# Patient Record
Sex: FEMALE | Race: WHITE | NOT HISPANIC OR LATINO | Employment: FULL TIME | URBAN - METROPOLITAN AREA
[De-identification: names, ages, dates, MRNs, and addresses within clinical notes are randomized per-mention and may not be internally consistent; named-entity substitution may affect disease eponyms.]

---

## 2017-01-04 ENCOUNTER — HOSPITAL PATHOLOGY (OUTPATIENT)
Dept: OTHER | Facility: CLINIC | Age: 57
End: 2017-01-04

## 2017-01-05 LAB — COPATH REPORT: NORMAL

## 2017-03-06 ENCOUNTER — RADIANT APPOINTMENT (OUTPATIENT)
Dept: GENERAL RADIOLOGY | Facility: CLINIC | Age: 57
End: 2017-03-06
Attending: FAMILY MEDICINE
Payer: COMMERCIAL

## 2017-03-06 ENCOUNTER — OFFICE VISIT (OUTPATIENT)
Dept: URGENT CARE | Facility: URGENT CARE | Age: 57
End: 2017-03-06
Payer: COMMERCIAL

## 2017-03-06 VITALS
DIASTOLIC BLOOD PRESSURE: 86 MMHG | TEMPERATURE: 98 F | HEART RATE: 79 BPM | WEIGHT: 170 LBS | SYSTOLIC BLOOD PRESSURE: 118 MMHG | OXYGEN SATURATION: 97 % | BODY MASS INDEX: 33.15 KG/M2

## 2017-03-06 DIAGNOSIS — M72.2 PLANTAR FASCIITIS OF LEFT FOOT: Primary | ICD-10-CM

## 2017-03-06 DIAGNOSIS — M72.2 PLANTAR FASCIITIS OF LEFT FOOT: ICD-10-CM

## 2017-03-06 PROCEDURE — 73630 X-RAY EXAM OF FOOT: CPT | Mod: LT

## 2017-03-06 PROCEDURE — 99213 OFFICE O/P EST LOW 20 MIN: CPT | Performed by: FAMILY MEDICINE

## 2017-03-06 NOTE — NURSING NOTE
"Chief Complaint   Patient presents with     Urgent Care     Musculoskeletal Problem     Has had pain in her left heel, saw podiatry and had an injection, but nothing is helping. It started hurting 8 months ago. Hurts a lot to walk. Would like an xray.        Initial /86  Pulse 79  Temp 98  F (36.7  C) (Tympanic)  Wt 170 lb (77.1 kg)  LMP 07/03/2007  SpO2 97%  BMI 33.15 kg/m2 Estimated body mass index is 33.15 kg/(m^2) as calculated from the following:    Height as of 12/31/16: 5' 0.05\" (1.525 m).    Weight as of this encounter: 170 lb (77.1 kg).  Medication Reconciliation: angelina Connors   "

## 2017-03-06 NOTE — LETTER
[facility] URGENT [facility]  [address]  [address]  [place] [address] [phone]      March 6, 2017    RE:  Saadia Sorensen                                                                                                                                                       [address] [address] [address]            To whom it may concern:    Saadia Sorensen is under my professional care for Plantar fasciitis of left foot.   She  may return to work with the following: Light duty-unable to stand/ walk more than 2 hours per shift for 2 weeks,  May use a cane as needed.          Sincerely,        Jeri Hamm    [facility]

## 2017-03-06 NOTE — PATIENT INSTRUCTIONS
Treating Plantar Fasciitis  First, your healthcare provider tries to determine the cause of your problem in order to suggest ways to relieve pain. If your pain is due to poor foot mechanics, custom-made shoe inserts (orthoses) may help.    Reduce symptoms    To relieve mild symptoms, try aspirin, ibuprofen, or other medicines as directed. Rubbing ice on the affected area may also help.    To reduce severe pain and swelling, your healthcare provider may prescribe pills or injections or a walking cast in some instances. Physical therapy, such as ultrasound or a daily stretching program, may also be recommended. Surgery is rarely required.    To reduce symptoms caused by poor foot mechanics, your foot may be taped. This supports the arch and temporarily controls movement. Night splints may also help by stretching the fascia.  Control movement  If taping helps, your healthcare provider may prescribe orthoses. Built from plaster casts of your feet, these inserts control the way your foot moves. As a result, your symptoms should go away.  Reduce overuse  Every time your foot strikes the ground, the plantar fascia is stretched. You can reduce the strain on the plantar fascia and the possibility of overuse by following these suggestions:    Lose any excess weight.    Avoid running on hard or uneven ground.    Use orthoses at all times in your shoes and house slippers.  If surgery is needed  Your healthcare provider may consider surgery if other types of treatment don't control your pain. During surgery, the plantar fascia is partially cut to release tension. As you heal, fibrous tissue fills the space between the heel bone and the plantar fascia.     5632-8972 [employer]. [address] [place] [address], [place] [address]. All rights reserved. This information is not intended as a substitute for professional medical care. Always follow your healthcare professional's instructions.        What Is Plantar Fasciitis?    The plantar fascia is a ligament-like band running from your heel to the ball of your foot. This band pulls on the heel bone, raising the arch of your foot as it pushes off the ground. But if your foot moves incorrectly, the plantar fascia may become strained. The fascia may swell and its tiny fibers may begin to fray, causing plantar fasciitis.  Causes  Plantar fasciitis is often caused by poor foot mechanics. If your foot flattens too much, the fascia may overstretch and swell. If your foot flattens too little, the fascia may ache from being pulled too tight.     Foot flattens too much        Foot flattens too little   Symptoms  With plantar fasciitis, the bottom of your foot may hurt when you stand, especially first thing in the morning. Pain usually happens on the inside of the foot, near the spot where your heel and arch meet. Pain may lessen after a few steps, but it comes back after rest or with prolonged movement.  Related problems  A heel spur is extra bone that may grow near the spot where the plantar fascia attaches to the heel. The heel spur may form in response to the plantar fascia s tug on the heel bone.  Bursitis is the swelling of a bursa, a fluid-filled sac that reduces friction between a ligament and a bone. Bursitis may develop if a swollen plantar fascia presses against a plantar bursa.    0182-4288 [employer]. [address] [place] [address], [place] [address]. All rights reserved. This information is not intended as a substitute for professional medical care. Always follow your healthcare professional's instructions.

## 2017-03-06 NOTE — PROGRESS NOTES
SUBJECTIVE:   Chief Complaint   Patient presents with     Urgent Care     Musculoskeletal Problem     Has had pain in her left heel, saw podiatry and had an injection, but nothing is helping. It started hurting 8 months ago. Hurts a lot to walk. Would like an xray.      Saadia Sorensen is a 56 year old female complains of localized left heel pain for 5 months. This hurts first thing in the morning immediately upon weight bearing, less so throughout the day. The patient denies a history of injury.  Had steroid injection with podiatry,  Has been seeing chiropracter without relief    Past Medical History   Diagnosis Date     Calculus of kidney      x 2     Gastritis 12/17/2013       ALLERGIES:  Codeine      Current Outpatient Prescriptions on File Prior to Visit:  order for DME Equipment being ordered: cast boot   omeprazole (PRILOSEC) 20 MG capsule Take 1 capsule (20 mg) by mouth daily (Patient not taking: Reported on 3/6/2017)   simvastatin (ZOCOR) 10 MG tablet Take 1 tablet (10 mg) by mouth At Bedtime (Patient not taking: Reported on 3/6/2017)   loratadine-pseudoePHEDrine (CLARITIN-D 24-HOUR)  MG per tablet Take 1 tablet by mouth daily (Patient not taking: Reported on 3/6/2017)     No current facility-administered medications on file prior to visit.     Social History   Substance Use Topics     Smoking status: Never Smoker     Smokeless tobacco: Never Used     Alcohol use No       Family History   Problem Relation Age of Onset     CANCER Mother      colon cancer, colostomy bag     Lipids Mother      Family History Negative Father      Family History Negative Maternal Grandmother      Family History Negative Maternal Grandfather      Family History Negative Paternal Grandmother      Family History Negative Paternal Grandfather      Family History Negative Brother      Family History Negative Sister          ROS:  INTEGUMENTARY/SKIN: NEGATIVE for worrisome rashes, moles or lesions  EYES: NEGATIVE for vision changes  or irritation  ENT/MOUTH: NEGATIVE for ear, mouth and throat problems  RESP:NEGATIVE for significant cough or SOB    OBJECTIVE:  /86  Pulse 79  Temp 98  F (36.7  C) (Tympanic)  Wt 170 lb (77.1 kg)  LMP 07/03/2007  SpO2 97%  BMI 33.15 kg/m2  Patient appears well, vital signs are normal. Foot exam reveals   point tenderness over the inferior aspect of left heel, without masses, deformity or edema.  The rest of the foot and ankle exam is normal. Color and temperature of the feet is normal. Peripheral pulses are normal.    ASSESSMENT:  Plantar fasciitis of left foot     - XR Foot Left G/E 3 Views; Future       Acetaminophen and/or ibuprofen for pain   try to reduce walking on hard surfaces and use cushioned footwear    Patient was given educational materials   Advise rehabilitative stretching exercises to improve symptoms.   Discussed adapting insoles to reduce the pressure of the site of greatest pain  Note for work .

## 2017-03-06 NOTE — MR AVS SNAPSHOT
After Visit Summary   3/6/2017    Saadia Sorensen    MRN: [id number]           Patient Information     Date Of Birth          1960        Visit Information        Provider Department      3/6/2017 12:00 PM Jeri Hamm MD [facility]        Today's Diagnoses     Plantar fasciitis of left foot    -  1      Care Instructions      Treating Plantar Fasciitis  First, your healthcare provider tries to determine the cause of your problem in order to suggest ways to relieve pain. If your pain is due to poor foot mechanics, custom-made shoe inserts (orthoses) may help.    Reduce symptoms    To relieve mild symptoms, try aspirin, ibuprofen, or other medicines as directed. Rubbing ice on the affected area may also help.    To reduce severe pain and swelling, your healthcare provider may prescribe pills or injections or a walking cast in some instances. Physical therapy, such as ultrasound or a daily stretching program, may also be recommended. Surgery is rarely required.    To reduce symptoms caused by poor foot mechanics, your foot may be taped. This supports the arch and temporarily controls movement. Night splints may also help by stretching the fascia.  Control movement  If taping helps, your healthcare provider may prescribe orthoses. Built from plaster casts of your feet, these inserts control the way your foot moves. As a result, your symptoms should go away.  Reduce overuse  Every time your foot strikes the ground, the plantar fascia is stretched. You can reduce the strain on the plantar fascia and the possibility of overuse by following these suggestions:    Lose any excess weight.    Avoid running on hard or uneven ground.    Use orthoses at all times in your shoes and house slippers.  If surgery is needed  Your healthcare provider may consider surgery if other types of treatment don't control your pain. During surgery, the plantar fascia is partially cut to release tension. As you  heal, fibrous tissue fills the space between the heel bone and the plantar fascia.     1741-3736 [employer]. [address] [place] [address]. All rights reserved. This information is not intended as a substitute for professional medical care. Always follow your healthcare professional's instructions.        What Is Plantar Fasciitis?   The plantar fascia is a ligament-like band running from your heel to the ball of your foot. This band pulls on the heel bone, raising the arch of your foot as it pushes off the ground. But if your foot moves incorrectly, the plantar fascia may become strained. The fascia may swell and its tiny fibers may begin to fray, causing plantar fasciitis.  Causes  Plantar fasciitis is often caused by poor foot mechanics. If your foot flattens too much, the fascia may overstretch and swell. If your foot flattens too little, the fascia may ache from being pulled too tight.     Foot flattens too much        Foot flattens too little   Symptoms  With plantar fasciitis, the bottom of your foot may hurt when you stand, especially first thing in the morning. Pain usually happens on the inside of the foot, near the spot where your heel and arch meet. Pain may lessen after a few steps, but it comes back after rest or with prolonged movement.  Related problems  A heel spur is extra bone that may grow near the spot where the plantar fascia attaches to the heel. The heel spur may form in response to the plantar fascia s tug on the heel bone.  Bursitis is the swelling of a bursa, a fluid-filled sac that reduces friction between a ligament and a bone. Bursitis may develop if a swollen plantar fascia presses against a plantar bursa.    4749-3071 [employer]. [address] [place] [address]. All rights reserved. This information is not intended as a substitute for professional medical care. Always follow your healthcare professional's instructions.               "Follow-ups after your visit        Who to contact     If you have questions or need follow up information about today's clinic visit or your schedule please contact [facility] directly at [phone].  Normal or non-critical lab and imaging results will be communicated to you by MyChart, letter or phone within 4 business days after the clinic has received the results. If you do not hear from us within 7 days, please contact the clinic through MyChart or phone. If you have a critical or abnormal lab result, we will notify you by phone as soon as possible.  Submit refill requests through [employer] or call your pharmacy and they will forward the refill request to us. Please allow 3 business days for your refill to be completed.          Additional Information About Your Visit        [employer] Information     [employer] lets you send messages to your doctor, view your test results, renew your prescriptions, schedule appointments and more. To sign up, go to [email or web address] . Click on \"Log in\" on the left side of the screen, which will take you to the Welcome page. Then click on \"Sign up Now\" on the right side of the page.     You will be asked to enter the access code listed below, as well as some personal information. Please follow the directions to create your username and password.     Your access code is: [email or web address]  Expires: 2017  1:21 PM     Your access code will  in 90 days. If you need help or a new code, please call your [place] clinic or [phone].        Care EveryWhere ID     This is your Care EveryWhere ID. This could be used by other organizations to access your [place] medical records  [id number]        Your Vitals Were     Pulse Temperature Last Period Pulse Oximetry BMI (Body Mass Index)       79 98  F (36.7  C) (Tympanic) 2007 97% 33.15 kg/m2        Blood Pressure from Last 3 Encounters:   17 118/86   16 118/80   16 124/74    Weight from Last " 3 Encounters:   03/06/17 170 lb (77.1 kg)   12/31/16 168 lb 6.4 oz (76.4 kg)   11/29/16 168 lb 8 oz (76.4 kg)               Primary Care Provider Office Phone # Fax #    Susan Rachele Haase REY [phone]       [address] [address]        Thank you!     Thank you for choosing [facility]  for your care. Our goal is always to provide you with excellent care. Hearing back from our patients is one way we can continue to improve our services. Please take a few minutes to complete the written survey that you may receive in the mail after your visit with us. Thank you!             Your Updated Medication List - Protect others around you: Learn how to safely use, store and throw away your medicines at www.disposemymeds.org.          This list is accurate as of: 3/6/17  1:21 PM.  Always use your most recent med list.                   Brand Name Dispense Instructions for use    loratadine-pseudoePHEDrine  MG per 24 hr tablet    CLARITIN-D 24-hour    14 tablet    Take 1 tablet by mouth daily       omeprazole 20 MG CR capsule    priLOSEC    90 capsule    Take 1 capsule (20 mg) by mouth daily       order for DME     1 Device    Equipment being ordered: cast boot       simvastatin 10 MG tablet    ZOCOR    90 tablet    Take 1 tablet (10 mg) by mouth At Bedtime

## 2017-05-09 ENCOUNTER — OFFICE VISIT (OUTPATIENT)
Dept: FAMILY MEDICINE | Facility: CLINIC | Age: 57
End: 2017-05-09
Payer: COMMERCIAL

## 2017-05-09 VITALS
BODY MASS INDEX: 33.75 KG/M2 | RESPIRATION RATE: 14 BRPM | DIASTOLIC BLOOD PRESSURE: 85 MMHG | WEIGHT: 171.9 LBS | HEART RATE: 82 BPM | HEIGHT: 60 IN | OXYGEN SATURATION: 94 % | TEMPERATURE: 97.9 F | SYSTOLIC BLOOD PRESSURE: 123 MMHG

## 2017-05-09 DIAGNOSIS — H66.001 ACUTE SUPPURATIVE OTITIS MEDIA OF RIGHT EAR WITHOUT SPONTANEOUS RUPTURE OF TYMPANIC MEMBRANE, RECURRENCE NOT SPECIFIED: Primary | ICD-10-CM

## 2017-05-09 PROCEDURE — 99213 OFFICE O/P EST LOW 20 MIN: CPT | Performed by: FAMILY MEDICINE

## 2017-05-09 RX ORDER — AZITHROMYCIN 250 MG/1
TABLET, FILM COATED ORAL
Qty: 6 TABLET | Refills: 0 | Status: SHIPPED | OUTPATIENT
Start: 2017-05-09 | End: 2018-11-14

## 2017-05-09 NOTE — Clinical Note
Please abstract the following data from this visit with this patient into the appropriate field in Epic:  Pap smear done on this date: 12/30/16 (approximately), by this group: [facility], results were negative.

## 2017-05-09 NOTE — PROGRESS NOTES
SUBJECTIVE:                                                    Saadia Sorensen is a 56 year old female who presents to clinic today for the following health issues:      Concern - Ear problem     Onset: 2 days    Description:   Pain in both ears, but mostly the right side    Intensity: 6/10    Progression of Symptoms:  worsening    Accompanying Signs & Symptoms:  Sore throat and congestion       Previous history of similar problem:   yes    Precipitating factors:   Worsened by: nothing    Alleviating factors:  Improved by: nothing        Therapies Tried and outcome: Advil did not help much    Patient complains of congestion with sore throat, nasal congestion and sinus pain. Some mucopurulant discharge but no upper dental pain and no sustained fever. Duration less than one week.  Has past history of airborn allergy or asthma.   No chest pain, diaphoresis, or sob.  Mostly nonproductive cough.  TMs dull not*red, sinus tenderness right   lungs clear, s1s2,soft abd,no distress, cyanosis or stridor.  A:URI with asom and sinusitis  P:fluids, antipyretics,rest and meds as directed.  Recheck PRN worsening or non-improvement over 5 days.  Discussed signs of systemic or constutional illness.

## 2017-05-09 NOTE — NURSING NOTE
"Chief Complaint   Patient presents with     Ear Problem       Initial /85 (BP Location: Left arm, Patient Position: Chair, Cuff Size: Adult Large)  Pulse 82  Temp 97.9  F (36.6  C) (Oral)  Resp 14  Ht 5' 0.05\" (1.525 m)  Wt 171 lb 14.4 oz (78 kg)  LMP 07/03/2007  SpO2 94%  BMI 33.52 kg/m2 Estimated body mass index is 33.52 kg/(m^2) as calculated from the following:    Height as of this encounter: 5' 0.05\" (1.525 m).    Weight as of this encounter: 171 lb 14.4 oz (78 kg).  Medication Reconciliation: complete   Cristhian Mchugh CMA       "

## 2017-05-09 NOTE — MR AVS SNAPSHOT
"              After Visit Summary   2017    Saadia Sorensen    MRN: [id number]           Patient Information     Date Of Birth          1960        Visit Information        Provider Department      2017 11:15 AM Joni Garcia MD [facility]        Today's Diagnoses     Acute suppurative otitis media of right ear without spontaneous rupture of tympanic membrane, recurrence not specified    -  1       Follow-ups after your visit        Who to contact     If you have questions or need follow up information about today's clinic visit or your schedule please contact [facility] directly at [phone].  Normal or non-critical lab and imaging results will be communicated to you by [employer], letter or phone within 4 business days after the clinic has received the results. If you do not hear from us within 7 days, please contact the clinic through MyChart or phone. If you have a critical or abnormal lab result, we will notify you by phone as soon as possible.  Submit refill requests through [employer] or call your pharmacy and they will forward the refill request to us. Please allow 3 business days for your refill to be completed.          Additional Information About Your Visit        MyChart Information     [employer] lets you send messages to your doctor, view your test results, renew your prescriptions, schedule appointments and more. To sign up, go to [email or web address] . Click on \"Log in\" on the left side of the screen, which will take you to the Welcome page. Then click on \"Sign up Now\" on the right side of the page.     You will be asked to enter the access code listed below, as well as some personal information. Please follow the directions to create your username and password.     Your access code is: [email or web address]  Expires: 2017  2:21 PM     Your access code will  in 90 days. If you need help or a new code, please call your [facility] or " "[phone].        Care EveryWhere ID     This is your Care EveryWhere ID. This could be used by other organizations to access your [place] medical records  [id number]        Your Vitals Were     Pulse Temperature Respirations Height Last Period Pulse Oximetry    82 97.9  F (36.6  C) (Oral) 14 5' 0.05\" (1.525 m) 07/03/2007 94%    BMI (Body Mass Index)                   33.52 kg/m2            Blood Pressure from Last 3 Encounters:   05/09/17 123/85   03/06/17 118/86   12/31/16 118/80    Weight from Last 3 Encounters:   05/09/17 171 lb 14.4 oz (78 kg)   03/06/17 170 lb (77.1 kg)   12/31/16 168 lb 6.4 oz (76.4 kg)              Today, you had the following     No orders found for display         Today's Medication Changes          These changes are accurate as of: 5/9/17 11:54 AM.  If you have any questions, ask your nurse or doctor.               Start taking these medicines.        Dose/Directions    azithromycin 250 MG tablet   Commonly known as:  ZITHROMAX   Used for:  Acute suppurative otitis media of right ear without spontaneous rupture of tympanic membrane, recurrence not specified   Started by:  Joni Garcia MD        Two tablets first day, then one tablet daily for four days.   Quantity:  6 tablet   Refills:  0            Where to get your medicines      These medications were sent to [facility] [address] [place] [address] [id number] [place] [address] AT [address]  [address] [facility], [facility] [address]     Phone:  [phone]     azithromycin 250 MG tablet                Primary Care Provider Office Phone # Fax #    Susan Rachele Haase, APRN [phone]       [facility] [address]  [facility] [address]        Thank you!     Thank you for choosing [facility]  for your care. Our goal is always to provide you with excellent care. Hearing back from our patients is one way we can continue to improve our services. Please take " a few minutes to complete the written survey that you may receive in the mail after your visit with us. Thank you!             Your Updated Medication List - Protect others around you: Learn how to safely use, store and throw away your medicines at www.disposemymeds.org.          This list is accurate as of: 5/9/17 11:54 AM.  Always use your most recent med list.                   Brand Name Dispense Instructions for use    azithromycin 250 MG tablet    ZITHROMAX    6 tablet    Two tablets first day, then one tablet daily for four days.       loratadine-pseudoePHEDrine  MG per 24 hr tablet    CLARITIN-D 24-hour    14 tablet    Take 1 tablet by mouth daily       omeprazole 20 MG CR capsule    priLOSEC    90 capsule    Take 1 capsule (20 mg) by mouth daily       simvastatin 10 MG tablet    ZOCOR    90 tablet    Take 1 tablet (10 mg) by mouth At Bedtime

## 2017-11-27 ENCOUNTER — HOSPITAL ENCOUNTER (OUTPATIENT)
Dept: MAMMOGRAPHY | Facility: CLINIC | Age: 57
Discharge: HOME | End: 2017-11-27
Attending: OBSTETRICS & GYNECOLOGY | Admitting: OBSTETRICS & GYNECOLOGY
Payer: COMMERCIAL

## 2017-11-27 DIAGNOSIS — Z12.31 VISIT FOR SCREENING MAMMOGRAM: ICD-10-CM

## 2017-11-27 PROCEDURE — G0202 SCR MAMMO BI INCL CAD: HCPCS

## 2018-04-14 ENCOUNTER — HEALTH MAINTENANCE LETTER (OUTPATIENT)
Age: 58
End: 2018-04-14

## 2018-11-14 ENCOUNTER — OFFICE VISIT (OUTPATIENT)
Dept: FAMILY MEDICINE | Facility: CLINIC | Age: 58
End: 2018-11-14
Payer: COMMERCIAL

## 2018-11-14 VITALS
DIASTOLIC BLOOD PRESSURE: 80 MMHG | SYSTOLIC BLOOD PRESSURE: 127 MMHG | BODY MASS INDEX: 32.47 KG/M2 | RESPIRATION RATE: 18 BRPM | HEIGHT: 61 IN | HEART RATE: 84 BPM | WEIGHT: 172 LBS | TEMPERATURE: 97.7 F

## 2018-11-14 DIAGNOSIS — J02.9 SORE THROAT: Primary | ICD-10-CM

## 2018-11-14 DIAGNOSIS — J06.9 VIRAL URI WITH COUGH: ICD-10-CM

## 2018-11-14 LAB
DEPRECATED S PYO AG THROAT QL EIA: NORMAL
SPECIMEN SOURCE: NORMAL

## 2018-11-14 PROCEDURE — 87081 CULTURE SCREEN ONLY: CPT | Performed by: PHYSICIAN ASSISTANT

## 2018-11-14 PROCEDURE — 99213 OFFICE O/P EST LOW 20 MIN: CPT | Performed by: PHYSICIAN ASSISTANT

## 2018-11-14 PROCEDURE — 87880 STREP A ASSAY W/OPTIC: CPT | Performed by: PHYSICIAN ASSISTANT

## 2018-11-14 NOTE — PROGRESS NOTES
SUBJECTIVE:   Saadia Sorensen is a 58 year old female who presents to clinic today for the following health issues:      Acute Illness   Acute illness concerns: sore throat  Onset: x 8 days    Fever: no     Chills/Sweats: YES-at pm     Headache (location?): YES    Sinus Pressure:YES    Conjunctivitis:  no    Ear Pain: YES- right ear    Rhinorrhea: YES    Congestion: YES    Sore Throat: YES- with painful and difficult swallowing      Cough: YES-productive of green sputum    Wheeze: no     Decreased Appetite: YES    Nausea: no     Vomiting: no     Diarrhea:  no     Dysuria/Freq.: no    Fatigue/Achiness: YES-body aches    Sick/Strep Exposure: no      Therapies Tried and outcome: mucinex-no relief      Problem list and histories reviewed & adjusted, as indicated.  Additional history: as documented    Patient Active Problem List   Diagnosis     Hypercholesteremia     HYPERLIPIDEMIA LDL GOAL <130     Gastritis     History of nephrolithiasis     Family history of colon cancer     Past Surgical History:   Procedure Laterality Date     C NONSPECIFIC PROCEDURE      (L) TM-operation     C NONSPECIFIC PROCEDURE      s/p spAb x 1     ENT SURGERY      ear drum ruptured and had surgery       Social History   Substance Use Topics     Smoking status: Never Smoker     Smokeless tobacco: Never Used     Alcohol use No     Family History   Problem Relation Age of Onset     Cancer Mother      colon cancer, colostomy bag     Lipids Mother      Family History Negative Father      Family History Negative Maternal Grandmother      Family History Negative Maternal Grandfather      Family History Negative Paternal Grandmother      Family History Negative Paternal Grandfather      Family History Negative Brother      Family History Negative Sister          No current outpatient prescriptions on file.     Allergies   Allergen Reactions     Codeine        Reviewed and updated as needed this visit by clinical staff  Tobacco  Allergies  Meds  Med  "Hx  Surg Hx  Fam Hx  Soc Hx      Reviewed and updated as needed this visit by Provider         ROS:  Constitutional, HEENT, cardiovascular, pulmonary, gi and gu systems are negative, except as otherwise noted.    OBJECTIVE:     /80 (BP Location: Right arm, Patient Position: Chair, Cuff Size: Adult Regular)  Pulse 84  Temp 97.7  F (36.5  C) (Oral)  Resp 18  Ht 5' 1\" (1.549 m)  Wt 172 lb (78 kg)  LMP 07/03/2007  BMI 32.5 kg/m2  Body mass index is 32.5 kg/(m^2).  GENERAL: healthy, alert and no distress  EYES: Eyes grossly normal to inspection, PERRL and conjunctivae and sclerae normal  HENT: ear canals and TM's normal, nose and mouth without ulcers or lesions  RESP: lungs clear to auscultation - no rales, rhonchi or wheezes  CV: regular rate and rhythm, normal S1 S2, no S3 or S4, no murmur, click or rub, no peripheral edema and peripheral pulses strong  MS: no gross musculoskeletal defects noted, no edema  SKIN: no suspicious lesions or rashes  NEURO: Normal strength and tone, mentation intact and speech normal  PSYCH: mentation appears normal, affect normal/bright  LYMPH: no cervical, supraclavicular, axillary, or inguinal adenopathy    Diagnostic Test Results:  Results for orders placed or performed in visit on 11/14/18 (from the past 24 hour(s))   Rapid strep screen   Result Value Ref Range    Specimen Description Throat     Rapid Strep A Screen       NEGATIVE: No Group A streptococcal antigen detected by immunoassay, await culture report.       ASSESSMENT/PLAN:       (J02.9) Sore throat  (primary encounter diagnosis)    Comment: Rapid strep is negative. Await culture. Likely viral from post-nasal drainage.    Plan: Rapid strep screen, Beta strep group A culture            (J06.9,  B97.89) Viral URI with cough    Comment: Continue over the counter treatments and call if not improving in 6 days to get an antibiotic.    Plan: See above.      Patient Instructions     You can continue to take Tylenol or " ibuprofen for pain and/or fever.    Use Cepacol drops or chloraseptic spray for sore throat.    You can gargle with warm salt water.     We will notify you if the strep culture is positive.    If not improving as expected, follow-up with primary care provider or sooner if worsening.       Call us if not improving in 6 days and I will send in an antibiotic for you.      Upper respiratory infections are usually caused by viruses and, sometimes certain bacteria.  Antibiotics don't help the vast majority of people recover any quicker even when caused by a bacteria.  The body will fight this infection but it needs to be treated well in order to help heal itself.  Rest as needed.  It is ok to reduce food intake if appetite is poor but it is quite important to maintain/increase fluid intake.    For cough, dextromethorphan/guaifenesin combinations help loosen secretions and suppress cough safely without significant risk of sedation. Often 2 puffs four times daily of an albuterol inhaler will help with bronchitis.  This is a prescription medicine.    For nasal congestion and sinus pressure, pseudoephedrine (Sudafed) or phenylephrine is often helpful but it can cause elevations in blood pressure and insomnia.  Short courses of a nasal decongestant spray (Afrin or Neosinephrine) can be appropriate but their use should be restricted to 3 days due to the high risk of nasal addiction.    For pain and fevers, acetaminophen (Tylenol) is most appropriate.  Ibuprofen (Advil) or naproxen (Aleve) are useful too and last longer but they can cause elevation of blood pressure or stomach problems.    Antihistamines (Benadryl, Dimetapp, etc.) cause sedation, confusion, bowel and urinary abnormalities and are of little use for infectious causes of cough and nasal congestion.  Their use should be reserved for allergic symptoms.        Chevy Villa PA-C  [facility]

## 2018-11-14 NOTE — MR AVS SNAPSHOT
After Visit Summary   11/14/2018    Saadia Sorensen    MRN: [id number]           Patient Information     Date Of Birth          1960        Visit Information        Provider Department      11/14/2018 1:40 PM Chevy Villa PA-C [facility]        Today's Diagnoses     Sore throat    -  1    Viral URI with cough          Care Instructions      You can continue to take Tylenol or ibuprofen for pain and/or fever.    Use Cepacol drops or chloraseptic spray for sore throat.    You can gargle with warm salt water.     We will notify you if the strep culture is positive.    If not improving as expected, follow-up with primary care provider or sooner if worsening.       Call us if not improving in 6 days and I will send in an antibiotic for you.      Upper respiratory infections are usually caused by viruses and, sometimes certain bacteria.  Antibiotics don't help the vast majority of people recover any quicker even when caused by a bacteria.  The body will fight this infection but it needs to be treated well in order to help heal itself.  Rest as needed.  It is ok to reduce food intake if appetite is poor but it is quite important to maintain/increase fluid intake.    For cough, dextromethorphan/guaifenesin combinations help loosen secretions and suppress cough safely without significant risk of sedation. Often 2 puffs four times daily of an albuterol inhaler will help with bronchitis.  This is a prescription medicine.    For nasal congestion and sinus pressure, pseudoephedrine (Sudafed) or phenylephrine is often helpful but it can cause elevations in blood pressure and insomnia.  Short courses of a nasal decongestant spray (Afrin or Neosinephrine) can be appropriate but their use should be restricted to 3 days due to the high risk of nasal addiction.    For pain and fevers, acetaminophen (Tylenol) is most appropriate.  Ibuprofen (Advil) or naproxen (Aleve) are useful too and last longer  "but they can cause elevation of blood pressure or stomach problems.    Antihistamines (Benadryl, Dimetapp, etc.) cause sedation, confusion, bowel and urinary abnormalities and are of little use for infectious causes of cough and nasal congestion.  Their use should be reserved for allergic symptoms.            Follow-ups after your visit        Who to contact     If you have questions or need follow up information about today's clinic visit or your schedule please contact [facility] directly at [phone].  Normal or non-critical lab and imaging results will be communicated to you by [employer], letter or phone within 4 business days after the clinic has received the results. If you do not hear from us within 7 days, please contact the clinic through [employer] or phone. If you have a critical or abnormal lab result, we will notify you by phone as soon as possible.  Submit refill requests through [employer] or call your pharmacy and they will forward the refill request to us. Please allow 3 business days for your refill to be completed.          Additional Information About Your Visit        [employer] Information     [employer] lets you send messages to your doctor, view your test results, renew your prescriptions, schedule appointments and more. To sign up, go to [email or web address] . Click on \"Log in\" on the left side of the screen, which will take you to the Welcome page. Then click on \"Sign up Now\" on the right side of the page.     You will be asked to enter the access code listed below, as well as some personal information. Please follow the directions to create your username and password.     Your access code is: [email or web address]  Expires: 2019  2:04 PM     Your access code will  in 90 days. If you need help or a new code, please call your [facility] or [phone].        Care EveryWhere ID     This is your Care EveryWhere ID. This could be used by other organizations to access your " "[facility] medical records  [id number]        Your Vitals Were     Pulse Temperature Respirations Height Last Period BMI (Body Mass Index)    84 97.7  F (36.5  C) (Oral) 18 5' 1\" (1.549 m) 07/03/2007 32.5 kg/m2       Blood Pressure from Last 3 Encounters:   11/14/18 127/80   05/09/17 123/85   03/06/17 118/86    Weight from Last 3 Encounters:   11/14/18 172 lb (78 kg)   05/09/17 171 lb 14.4 oz (78 kg)   03/06/17 170 lb (77.1 kg)              We Performed the Following     Beta strep group A culture     Rapid strep screen        Primary Care Provider Office Phone # Fax #    Deepali Perry Haase, APRN [phone]       [address] [facility] [address]        Equal Access to Services     [facility]: Hadii aad ku hadasho Soisaakali, waaxda luqadaha, qaybta kaalmada adeegyada, waxay anetain hayaan ademaggy pérez . So [facility] [phone].    ATENCIÓN: Si habla español, tiene a mcintosh disposición servicios gratuitos de asistencia lingüística. Llame al [phone].    We comply with applicable federal civil rights laws and [place] laws. We do not discriminate on the basis of race, color, national origin, age, disability, sex, sexual orientation, or gender identity.            Thank you!     Thank you for choosing [facility]  for your care. Our goal is always to provide you with excellent care. Hearing back from our patients is one way we can continue to improve our services. Please take a few minutes to complete the written survey that you may receive in the mail after your visit with us. Thank you!             Your Updated Medication List - Protect others around you: Learn how to safely use, store and throw away your medicines at www.disposemymeds.org.      Notice  As of 11/14/2018  2:04 PM    You have not been prescribed any medications.      "

## 2018-11-14 NOTE — PATIENT INSTRUCTIONS
You can continue to take Tylenol or ibuprofen for pain and/or fever.    Use Cepacol drops or chloraseptic spray for sore throat.    You can gargle with warm salt water.     We will notify you if the strep culture is positive.    If not improving as expected, follow-up with primary care provider or sooner if worsening.       Call us if not improving in 6 days and I will send in an antibiotic for you.      Upper respiratory infections are usually caused by viruses and, sometimes certain bacteria.  Antibiotics don't help the vast majority of people recover any quicker even when caused by a bacteria.  The body will fight this infection but it needs to be treated well in order to help heal itself.  Rest as needed.  It is ok to reduce food intake if appetite is poor but it is quite important to maintain/increase fluid intake.    For cough, dextromethorphan/guaifenesin combinations help loosen secretions and suppress cough safely without significant risk of sedation. Often 2 puffs four times daily of an albuterol inhaler will help with bronchitis.  This is a prescription medicine.    For nasal congestion and sinus pressure, pseudoephedrine (Sudafed) or phenylephrine is often helpful but it can cause elevations in blood pressure and insomnia.  Short courses of a nasal decongestant spray (Afrin or Neosinephrine) can be appropriate but their use should be restricted to 3 days due to the high risk of nasal addiction.    For pain and fevers, acetaminophen (Tylenol) is most appropriate.  Ibuprofen (Advil) or naproxen (Aleve) are useful too and last longer but they can cause elevation of blood pressure or stomach problems.    Antihistamines (Benadryl, Dimetapp, etc.) cause sedation, confusion, bowel and urinary abnormalities and are of little use for infectious causes of cough and nasal congestion.  Their use should be reserved for allergic symptoms.

## 2018-11-15 LAB
BACTERIA SPEC CULT: NORMAL
SPECIMEN SOURCE: NORMAL

## 2019-06-12 ENCOUNTER — HOSPITAL ENCOUNTER (OUTPATIENT)
Dept: MAMMOGRAPHY | Facility: CLINIC | Age: 59
Discharge: HOME | End: 2019-06-12
Attending: OBSTETRICS & GYNECOLOGY | Admitting: OBSTETRICS & GYNECOLOGY
Payer: COMMERCIAL

## 2019-06-12 DIAGNOSIS — Z12.31 SCREENING MAMMOGRAM, ENCOUNTER FOR: ICD-10-CM

## 2019-06-12 PROCEDURE — 77063 BREAST TOMOSYNTHESIS BI: CPT

## 2019-07-17 ENCOUNTER — OFFICE VISIT (OUTPATIENT)
Dept: FAMILY MEDICINE | Facility: CLINIC | Age: 59
End: 2019-07-17
Payer: COMMERCIAL

## 2019-07-17 ENCOUNTER — TELEPHONE (OUTPATIENT)
Dept: FAMILY MEDICINE | Facility: CLINIC | Age: 59
End: 2019-07-17

## 2019-07-17 VITALS
WEIGHT: 168 LBS | RESPIRATION RATE: 16 BRPM | HEIGHT: 61 IN | TEMPERATURE: 97.7 F | DIASTOLIC BLOOD PRESSURE: 78 MMHG | BODY MASS INDEX: 31.72 KG/M2 | HEART RATE: 71 BPM | SYSTOLIC BLOOD PRESSURE: 118 MMHG

## 2019-07-17 DIAGNOSIS — K21.9 GASTROESOPHAGEAL REFLUX DISEASE WITHOUT ESOPHAGITIS: ICD-10-CM

## 2019-07-17 DIAGNOSIS — E78.5 HYPERLIPIDEMIA LDL GOAL <130: ICD-10-CM

## 2019-07-17 DIAGNOSIS — Z02.9 ADMINISTRATIVE ENCOUNTER: ICD-10-CM

## 2019-07-17 DIAGNOSIS — Z00.00 ROUTINE GENERAL MEDICAL EXAMINATION AT A HEALTH CARE FACILITY: Primary | ICD-10-CM

## 2019-07-17 DIAGNOSIS — Z12.11 SPECIAL SCREENING FOR MALIGNANT NEOPLASMS, COLON: ICD-10-CM

## 2019-07-17 LAB
ALBUMIN SERPL-MCNC: 3.9 G/DL (ref 3.4–5)
ALP SERPL-CCNC: 86 U/L (ref 40–150)
ALT SERPL W P-5'-P-CCNC: 19 U/L (ref 0–50)
ANION GAP SERPL CALCULATED.3IONS-SCNC: 7 MMOL/L (ref 3–14)
AST SERPL W P-5'-P-CCNC: 14 U/L (ref 0–45)
BASOPHILS # BLD AUTO: 0 10E9/L (ref 0–0.2)
BASOPHILS NFR BLD AUTO: 0.2 %
BILIRUB SERPL-MCNC: 0.5 MG/DL (ref 0.2–1.3)
BUN SERPL-MCNC: 14 MG/DL (ref 7–30)
CALCIUM SERPL-MCNC: 9.1 MG/DL (ref 8.5–10.1)
CHLORIDE SERPL-SCNC: 108 MMOL/L (ref 94–109)
CHOLEST SERPL-MCNC: 266 MG/DL
CO2 SERPL-SCNC: 26 MMOL/L (ref 20–32)
CREAT SERPL-MCNC: 0.65 MG/DL (ref 0.52–1.04)
DIFFERENTIAL METHOD BLD: NORMAL
EOSINOPHIL # BLD AUTO: 0.5 10E9/L (ref 0–0.7)
EOSINOPHIL NFR BLD AUTO: 5.4 %
ERYTHROCYTE [DISTWIDTH] IN BLOOD BY AUTOMATED COUNT: 14.4 % (ref 10–15)
GFR SERPL CREATININE-BSD FRML MDRD: >90 ML/MIN/{1.73_M2}
GLUCOSE SERPL-MCNC: 93 MG/DL (ref 70–99)
HBA1C MFR BLD: 5.3 % (ref 0–5.6)
HCT VFR BLD AUTO: 44.6 % (ref 35–47)
HDLC SERPL-MCNC: 48 MG/DL
HGB BLD-MCNC: 14.2 G/DL (ref 11.7–15.7)
LDLC SERPL CALC-MCNC: 191 MG/DL
LYMPHOCYTES # BLD AUTO: 2.8 10E9/L (ref 0.8–5.3)
LYMPHOCYTES NFR BLD AUTO: 34.1 %
MCH RBC QN AUTO: 28.2 PG (ref 26.5–33)
MCHC RBC AUTO-ENTMCNC: 31.8 G/DL (ref 31.5–36.5)
MCV RBC AUTO: 89 FL (ref 78–100)
MONOCYTES # BLD AUTO: 0.4 10E9/L (ref 0–1.3)
MONOCYTES NFR BLD AUTO: 5.2 %
NEUTROPHILS # BLD AUTO: 4.5 10E9/L (ref 1.6–8.3)
NEUTROPHILS NFR BLD AUTO: 55.1 %
NONHDLC SERPL-MCNC: 218 MG/DL
PLATELET # BLD AUTO: 243 10E9/L (ref 150–450)
POTASSIUM SERPL-SCNC: 4.4 MMOL/L (ref 3.4–5.3)
PROT SERPL-MCNC: 7.6 G/DL (ref 6.8–8.8)
RBC # BLD AUTO: 5.03 10E12/L (ref 3.8–5.2)
SODIUM SERPL-SCNC: 141 MMOL/L (ref 133–144)
TRIGL SERPL-MCNC: 135 MG/DL
TSH SERPL DL<=0.005 MIU/L-ACNC: 2.62 MU/L (ref 0.4–4)
WBC # BLD AUTO: 8.3 10E9/L (ref 4–11)

## 2019-07-17 PROCEDURE — 80061 LIPID PANEL: CPT | Performed by: NURSE PRACTITIONER

## 2019-07-17 PROCEDURE — 83036 HEMOGLOBIN GLYCOSYLATED A1C: CPT | Performed by: NURSE PRACTITIONER

## 2019-07-17 PROCEDURE — 99396 PREV VISIT EST AGE 40-64: CPT | Performed by: NURSE PRACTITIONER

## 2019-07-17 PROCEDURE — 80053 COMPREHEN METABOLIC PANEL: CPT | Performed by: NURSE PRACTITIONER

## 2019-07-17 PROCEDURE — 85025 COMPLETE CBC W/AUTO DIFF WBC: CPT | Performed by: NURSE PRACTITIONER

## 2019-07-17 PROCEDURE — 84443 ASSAY THYROID STIM HORMONE: CPT | Performed by: NURSE PRACTITIONER

## 2019-07-17 PROCEDURE — 36415 COLL VENOUS BLD VENIPUNCTURE: CPT | Performed by: NURSE PRACTITIONER

## 2019-07-17 ASSESSMENT — ENCOUNTER SYMPTOMS
EYE PAIN: 0
ABDOMINAL PAIN: 1
DIZZINESS: 1
DIARRHEA: 0
NERVOUS/ANXIOUS: 0
CHILLS: 1
COUGH: 0
HEMATOCHEZIA: 0
CONSTIPATION: 1
FEVER: 0
HEMATURIA: 0

## 2019-07-17 ASSESSMENT — MIFFLIN-ST. JEOR: SCORE: 1276.48

## 2019-07-17 NOTE — LETTER
July 18, 2019      Saadia Sorensen  [address] [facility] [address]        Dear ,    Your lab results are as below:   1)  TSH (thyroid level) 2.62 which is normal (range 0.4-4)   2)  Cholesterol is  elevated at 266, your LDL (bad cholesterol) is also elevated at 191 and your HDL (good cholesterol) is within normal range.   3)  Glucose is normal at 93 (normal fasting is <100). Your A1C (test for diabetes) is normal at 5.3%.   4)  CBC (checks for anemia and infection) is normal.     Resulted Orders   CBC with platelets differential   Result Value Ref Range    WBC 8.3 4.0 - 11.0 10e9/L    RBC Count 5.03 3.8 - 5.2 10e12/L    Hemoglobin 14.2 11.7 - 15.7 g/dL    Hematocrit 44.6 35.0 - 47.0 %    MCV 89 78 - 100 fl    MCH 28.2 26.5 - 33.0 pg    MCHC 31.8 31.5 - 36.5 g/dL    RDW 14.4 10.0 - 15.0 %    Platelet Count 243 150 - 450 10e9/L    % Neutrophils 55.1 %    % Lymphocytes 34.1 %    % Monocytes 5.2 %    % Eosinophils 5.4 %    % Basophils 0.2 %    Absolute Neutrophil 4.5 1.6 - 8.3 10e9/L    Absolute Lymphocytes 2.8 0.8 - 5.3 10e9/L    Absolute Monocytes 0.4 0.0 - 1.3 10e9/L    Absolute Eosinophils 0.5 0.0 - 0.7 10e9/L    Absolute Basophils 0.0 0.0 - 0.2 10e9/L    Diff Method Automated Method    Comprehensive metabolic panel   Result Value Ref Range    Sodium 141 133 - 144 mmol/L    Potassium 4.4 3.4 - 5.3 mmol/L    Chloride 108 94 - 109 mmol/L    Carbon Dioxide 26 20 - 32 mmol/L    Anion Gap 7 3 - 14 mmol/L    Glucose 93 70 - 99 mg/dL      Comment:      Fasting specimen    Urea Nitrogen 14 7 - 30 mg/dL    Creatinine 0.65 0.52 - 1.04 mg/dL    GFR Estimate >90 >60 mL/min/[1.73_m2]      Comment:      Non  GFR Calc  Starting 12/18/2018, serum creatinine based estimated GFR (eGFR) will be   calculated using the Chronic Kidney Disease Epidemiology Collaboration   (CKD-EPI) equation.      GFR Estimate If Black >90 >60 mL/min/[1.73_m2]      Comment:       GFR Calc  Starting 12/18/2018,  serum creatinine based estimated GFR (eGFR) will be   calculated using the Chronic Kidney Disease Epidemiology Collaboration   (CKD-EPI) equation.      Calcium 9.1 8.5 - 10.1 mg/dL    Bilirubin Total 0.5 0.2 - 1.3 mg/dL    Albumin 3.9 3.4 - 5.0 g/dL    Protein Total 7.6 6.8 - 8.8 g/dL    Alkaline Phosphatase 86 40 - 150 U/L    ALT 19 0 - 50 U/L    AST 14 0 - 45 U/L   Lipid panel reflex to direct LDL Fasting   Result Value Ref Range    Cholesterol 266 (H) <200 mg/dL      Comment:      Desirable:       <200 mg/dl    Triglycerides 135 <150 mg/dL      Comment:      Fasting specimen    HDL Cholesterol 48 (L) >49 mg/dL    LDL Cholesterol Calculated 191 (H) <100 mg/dL      Comment:      Above desirable:  100-129 mg/dl  Borderline High:  130-159 mg/dL  High:             160-189 mg/dL  Very high:       >189 mg/dl      Non HDL Cholesterol 218 (H) <130 mg/dL      Comment:      Above Desirable:  130-159 mg/dl  Borderline high:  160-189 mg/dl  High:             190-219 mg/dl  Very high:       >219 mg/dl     Hemoglobin A1c   Result Value Ref Range    Hemoglobin A1C 5.3 0 - 5.6 %      Comment:      Normal <5.7% Prediabetes 5.7-6.4%  Diabetes 6.5% or higher - adopted from ADA   consensus guidelines.     TSH with free T4 reflex   Result Value Ref Range    TSH 2.62 0.40 - 4.00 mU/L       If you have any questions or concerns, please call the clinic at [phone].       Sincerely,        Susan Haase, APRN CNP/

## 2019-07-17 NOTE — PROGRESS NOTES
SUBJECTIVE:   CC: Saadia Sorensen is an 58 year old woman who presents for preventive health visit.     OB/GYN appt tomorrow (7/18)  Does not want DTAP today    Healthy Habits:     Getting at least 3 servings of Calcium per day:  Yes    Bi-annual eye exam:  NO    Dental care twice a year:  Yes    Sleep apnea or symptoms of sleep apnea:  None    Diet:  Regular (no restrictions)    Frequency of exercise:  4-5 days/week    Duration of exercise:  45-60 minutes    Taking medications regularly:  Yes    Medication side effects:  Other    PHQ-2 Total Score: 0    Additional concerns today:  No    1. Cervical cancer screening:  Last pap 12/2016, has OB/GYN appt tomorrow.   2. Breast cancer screening:  Last mammogram 6/2019.  3.  Colon cancer screening:  Last colonoscopy in 2013, due every 5 years, mom with history of colon cancer.  4.  Abdominal pain:  Increased abdominal pain with dairy and gas for about a month, last week took Pepcid for 2 days with some relief.  Episodes come and go.  With one severe episode had nausea, no vomiting.  Feels better sitting up vs laying down.    5.  Constipation:  Long standing history of constipation, getting worse with age, has BM every other day. Drinks a tea that helps soften stool.    6.  Elevated cholesterol:  Feels that her cholesterol is elevated, is willing to start back on her cholesterol medication if is elevated again.    Social:  Works at [employer], has child in high school and 1 in college.     Today's PHQ-2 Score:   PHQ-2 ( 1999 [employer]) 7/17/2019   Q1: Little interest or pleasure in doing things 0   Q2: Feeling down, depressed or hopeless 0   PHQ-2 Score 0   Q1: Little interest or pleasure in doing things Not at all   Q2: Feeling down, depressed or hopeless Not at all   PHQ-2 Score 0       Abuse: Current or Past(Physical, Sexual or Emotional)- No  Do you feel safe in your environment? Yes    Social History     Tobacco Use     Smoking status: Never Smoker     Smokeless  tobacco: Never Used   Substance Use Topics     Alcohol use: No     Frequency: Never     Binge frequency: Never     If you drink alcohol do you typically have >3 drinks per day or >7 drinks per week? No    Alcohol Use 7/17/2019   Prescreen: >3 drinks/day or >7 drinks/week? No   Prescreen: >3 drinks/day or >7 drinks/week? -     Reviewed orders with patient.  Reviewed health maintenance and updated orders accordingly - Yes  BP Readings from Last 3 Encounters:   07/17/19 118/78   11/14/18 127/80   05/09/17 123/85    Wt Readings from Last 3 Encounters:   07/17/19 76.2 kg (168 lb)   11/14/18 78 kg (172 lb)   05/09/17 78 kg (171 lb 14.4 oz)         Patient Active Problem List   Diagnosis     Hypercholesteremia     HYPERLIPIDEMIA LDL GOAL <130     Gastritis     History of nephrolithiasis     Family history of colon cancer     Past Surgical History:   Procedure Laterality Date     C NONSPECIFIC PROCEDURE      (L) TM-operation     C NONSPECIFIC PROCEDURE      s/p spAb x 1     ENT SURGERY      ear drum ruptured and had surgery       Social History     Tobacco Use     Smoking status: Never Smoker     Smokeless tobacco: Never Used   Substance Use Topics     Alcohol use: No     Frequency: Never     Binge frequency: Never     Family History   Problem Relation Age of Onset     Cancer Mother         colon cancer, colostomy bag     Lipids Mother      Family History Negative Father      Family History Negative Maternal Grandmother      Family History Negative Maternal Grandfather      Family History Negative Paternal Grandmother      Family History Negative Paternal Grandfather      Family History Negative Brother      Family History Negative Sister          Allergies   Allergen Reactions     Codeine        Mammogram Screening: Patient over age 50, mutual decision to screen reflected in health maintenance.    Pertinent mammograms are reviewed under the imaging tab.  History of abnormal Pap smear: NO - age 30- 65 PAP every 3 years  "recommended     Reviewed and updated as needed this visit by clinical staff  Allergies  Meds         Reviewed and updated as needed this visit by Provider          Review of Systems   Constitutional: Positive for chills. Negative for fever.   HENT: Positive for congestion. Negative for ear pain.    Eyes: Negative for pain.   Respiratory: Negative for cough.    Cardiovascular: Negative for chest pain.   Gastrointestinal: Positive for abdominal pain and constipation. Negative for diarrhea and hematochezia.   Genitourinary: Negative for hematuria.   Neurological: Positive for dizziness.   Psychiatric/Behavioral: The patient is not nervous/anxious.     Congestion in her chest at times during the winter, some wheezing.    OBJECTIVE:   /78 (BP Location: Right arm, Patient Position: Sitting, Cuff Size: Adult Regular)   Pulse 71   Temp 97.7  F (36.5  C) (Oral)   Resp 16   Ht 1.537 m (5' 0.5\")   Wt 76.2 kg (168 lb)   LMP 07/03/2007   PF 95 L/min   BMI 32.27 kg/m    Physical Exam  GENERAL APPEARANCE: healthy, alert and no distress  EYES: Eyes grossly normal to inspection, PERRL and conjunctivae and sclerae normal  HENT: ear canals and TM's normal, nose and mouth without ulcers or lesions, oropharynx clear and oral mucous membranes moist  NECK: no adenopathy, no asymmetry, masses, or scars and thyroid normal to palpation  RESP: lungs clear to auscultation - no rales, rhonchi or wheezes  BREAST: normal without masses, tenderness or nipple discharge and no palpable axillary masses or adenopathy  CV: regular rate and rhythm, normal S1 S2, no S3 or S4, no murmur, click or rub, no peripheral edema   ABDOMEN: soft, epigastric tenderness, no hepatosplenomegaly, no masses and bowel sounds normal  MS: no musculoskeletal defects are noted and gait is age appropriate without ataxia  SKIN: no suspicious lesions or rashes  NEURO: Normal strength and tone, sensory exam grossly normal, mentation intact and speech " "normal  PSYCH: mentation appears normal and affect normal/bright    ASSESSMENT/PLAN:   Saadia was seen today for physical.    Diagnoses and all orders for this visit:    Routine general medical examination at a health care facility  -     CBC with platelets differential  -     Comprehensive metabolic panel  -     Lipid panel reflex to direct LDL Fasting  -     Hemoglobin A1c  -     TSH with free T4 reflex    Gastroesophageal reflux disease without esophagitis: discussed that symptoms of abdominal pain likely due to GERD, will try omeprazole for 2 weeks, take every morning 30 min prior to breakfast, continue to eliminate caffeine and acid foods, if symptoms get worse or do not improve recommend follow up.   -     omeprazole (PRILOSEC) 20 MG DR capsule; Take 1 capsule (20 mg) by mouth daily    Administrative Encounter:  Biometric form in providers box, will complete when labs are back and fax to employer.     Special screening for malignant neoplasms, colon  -     Cancel: GASTROENTEROLOGY ADULT REF PROCEDURE ONLY  -     GASTROENTEROLOGY ADULT REF PROCEDURE ONLY Nuria Burdick [phone]; [facility]    Hyperlipidemia LDL goal <130    COUNSELING:  Reviewed preventive health counseling, as reflected in patient instructions       Regular exercise       Healthy diet/nutrition    Estimated body mass index is 32.27 kg/m  as calculated from the following:    Height as of this encounter: 1.537 m (5' 0.5\").    Weight as of this encounter: 76.2 kg (168 lb).    Weight management plan: Discussed healthy diet and exercise guidelines     reports that she has never smoked. She has never used smokeless tobacco.      Counseling Resources:  ATP IV Guidelines  Pooled Cohorts Equation Calculator  Breast Cancer Risk Calculator  FRAX Risk Assessment  ICSI Preventive Guidelines  Dietary Guidelines for Americans, 2010  USDA's MyPlate  ASA Prophylaxis  Lung CA Screening  Follow up in 4 weeks if abdominal pain does not improve, sooner as " needed.   Susan Haase, APRN [facility]

## 2019-09-04 ENCOUNTER — HOSPITAL ENCOUNTER (OUTPATIENT)
Facility: CLINIC | Age: 59
Discharge: HOME | End: 2019-09-04
Attending: INTERNAL MEDICINE | Admitting: INTERNAL MEDICINE
Payer: COMMERCIAL

## 2019-09-04 VITALS
BODY MASS INDEX: 31.72 KG/M2 | RESPIRATION RATE: 21 BRPM | HEIGHT: 61 IN | OXYGEN SATURATION: 97 % | DIASTOLIC BLOOD PRESSURE: 71 MMHG | WEIGHT: 168 LBS | HEART RATE: 73 BPM | SYSTOLIC BLOOD PRESSURE: 106 MMHG

## 2019-09-04 LAB — COLONOSCOPY: NORMAL

## 2019-09-04 PROCEDURE — 88305 TISSUE EXAM BY PATHOLOGIST: CPT | Mod: 26 | Performed by: INTERNAL MEDICINE

## 2019-09-04 PROCEDURE — 45385 COLONOSCOPY W/LESION REMOVAL: CPT | Performed by: INTERNAL MEDICINE

## 2019-09-04 PROCEDURE — 88305 TISSUE EXAM BY PATHOLOGIST: CPT | Performed by: INTERNAL MEDICINE

## 2019-09-04 PROCEDURE — 25000128 H RX IP 250 OP 636: Performed by: INTERNAL MEDICINE

## 2019-09-04 PROCEDURE — G0500 MOD SEDAT ENDO SERVICE >5YRS: HCPCS | Performed by: INTERNAL MEDICINE

## 2019-09-04 PROCEDURE — 25000132 ZZH RX MED GY IP 250 OP 250 PS 637: Performed by: INTERNAL MEDICINE

## 2019-09-04 RX ORDER — ONDANSETRON 2 MG/ML
4 INJECTION INTRAMUSCULAR; INTRAVENOUS EVERY 6 HOURS PRN
Status: DISCONTINUED | OUTPATIENT
Start: 2019-09-04 | End: 2019-09-04 | Stop reason: HOSPADM

## 2019-09-04 RX ORDER — ONDANSETRON 4 MG/1
4 TABLET, ORALLY DISINTEGRATING ORAL EVERY 6 HOURS PRN
Status: DISCONTINUED | OUTPATIENT
Start: 2019-09-04 | End: 2019-09-04 | Stop reason: HOSPADM

## 2019-09-04 RX ORDER — FENTANYL CITRATE 50 UG/ML
INJECTION, SOLUTION INTRAMUSCULAR; INTRAVENOUS PRN
Status: DISCONTINUED | OUTPATIENT
Start: 2019-09-04 | End: 2019-09-04 | Stop reason: HOSPADM

## 2019-09-04 RX ORDER — ONDANSETRON 2 MG/ML
4 INJECTION INTRAMUSCULAR; INTRAVENOUS
Status: DISCONTINUED | OUTPATIENT
Start: 2019-09-04 | End: 2019-09-04 | Stop reason: HOSPADM

## 2019-09-04 RX ORDER — SIMETHICONE 20 MG/.3ML
EMULSION ORAL PRN
Status: DISCONTINUED | OUTPATIENT
Start: 2019-09-04 | End: 2019-09-04 | Stop reason: HOSPADM

## 2019-09-04 RX ORDER — LIDOCAINE 40 MG/G
CREAM TOPICAL
Status: DISCONTINUED | OUTPATIENT
Start: 2019-09-04 | End: 2019-09-04 | Stop reason: HOSPADM

## 2019-09-04 RX ORDER — NALOXONE HYDROCHLORIDE 0.4 MG/ML
.1-.4 INJECTION, SOLUTION INTRAMUSCULAR; INTRAVENOUS; SUBCUTANEOUS
Status: DISCONTINUED | OUTPATIENT
Start: 2019-09-04 | End: 2019-09-04 | Stop reason: HOSPADM

## 2019-09-04 RX ORDER — FLUMAZENIL 0.1 MG/ML
0.2 INJECTION, SOLUTION INTRAVENOUS
Status: DISCONTINUED | OUTPATIENT
Start: 2019-09-04 | End: 2019-09-04 | Stop reason: HOSPADM

## 2019-09-04 ASSESSMENT — MIFFLIN-ST. JEOR: SCORE: 1276.48

## 2019-09-04 NOTE — PROCEDURES
PRE-PROCEDURE H&P    CHIEF COMPLAINT / REASON FOR PROCEDURE:  surveillance    PERTINENT HISTORY :    Past Medical History:   Diagnosis Date     Calculus of kidney     x 2     Gastritis 12/17/2013      Past Surgical History:   Procedure Laterality Date     C NONSPECIFIC PROCEDURE      (L) TM-operation     C NONSPECIFIC PROCEDURE      s/p spAb x 1     COLONOSCOPY       ENT SURGERY      ear drum ruptured and had surgery         Bleeding tendencies:  No    Relevant Family History:  NONE     Relevant Social History:  NONE      A relevant review of systems was performed and was negative      ALLERGIES/SENSITIVITIES:   Allergies   Allergen Reactions     Codeine        CURRENT MEDICATIONS:   No current outpatient medications on file.        PRE-SEDATION ASSESSMENT:    Lung Exam:  normal  Heart Exam:  normal  Airway Exam: normal  Previous reaction to anesthesia/sedation:   No  Sedation plan based on assessment: Moderate (conscious) sedation  ASA Classification:  1 - Healthy patient, no medical problems         IMPRESSION:  surveillance    PLAN:  colonoacopy    Nancy Hatch MD  [facility]  Office: [phone]

## 2019-09-04 NOTE — DISCHARGE INSTRUCTIONS
Understanding Colon and Rectal Polyps     The colon has a smooth lining composed of millions of cells.     The colon (also called the large intestine) is a muscular tube that forms the last part of the digestive tract. It absorbs water and stores food waste. The colon is about 4 to 6 feet long. The rectum is the last 6 inches of the colon. The colon and rectum have a smooth lining composed of millions of cells. Changes in these cells can lead to growths in the colon that can become cancerous and should be removed.     When the Colon Lining Changes  Changes that occur in the cells that line the colon or rectum can lead to growths called polyps. Over a period of years, polyps can turn cancerous. Removing polyps early may prevent cancer from ever forming.      Polyps  Polyps are fleshy clumps of tissue that form on the lining of the colon or rectum. Small polyps are usually benign (not cancerous). However, over time, cells in a polyp can change and become cancerous. The larger a polyp grows, the more likely this is to happen. Also, certain types of polyps known as adenomatous polyps are considered premalignant. This means that they will almost always become cancerous if they re not removed.          Cancer  Almost all colorectal cancers start when polyp cells begin growing abnormally. As a cancerous tumor grows, it may involve more and more of the colon or rectum. In time, cancer can also grow beyond the colon or rectum and spread to nearby organs or to glands called lymph nodes. The cells can also travel to other parts of the body. This is known as metastasis. The earlier a cancerous tumor is removed, the better the chance of preventing its spread.        [id number] [facility], [address] [place] [address], [place] [address]. All rights reserved. This information is not intended as a substitute for professional medical care. Always follow your healthcare professional's instructions.    HEMORRHOIDS, External      A  hemorrhoid is a local swelling of the veins around the rectum. These most often occur from repeated forceful straining during bowel movements or heavy lifting. It may also occur in the last few months of pregnancy. A hemorrhoid feels like a soft lump. It may itch from time to time. When it is inflamed it becomes hard and very painful.    HOME CARE:  1. SITZ BATHS: Sit in a tub filled with about 6 inches of hot water. Allow the water to run in order to keep it hot for a total of 10-15 minutes. Repeat this three times a day until pain is relieved.  2. Keep your stools soft to avoid the need to strain when having a bowel movement. Unless another medicine was prescribed, try the following:  IF YOU ARE CONSTIPATED: You may use over-the-counter laxatives such as MILK OF MAGNESIA (mild acting) or, DULCOLAX (if stronger action is needed).  IF YOU ARE NOT CONSTIPATED but stools are hard, try taking Colace (docusate sodium) which is a stool softener. This will soften stools without producing diarrhea. Drinking extra fluids may also help.  3. The use of creams applied to the hemorrhoid itself, such as ANUSOL or PREPARATION H, will be helpful to reduce pain and itching, and speed healing.    PREVENTION:  Avoid straining on the toilet by keeping stools soft. Increasing FIBER in your diet (fruits, cereals, vegetables and grains) will promote healthy bowel movement. If this is not working, you may use METAMUCIL and similar products. These are over-the-counter fiber supplements. You must drink extra fluids when taking these to avoid constipation.  FOLLOW UP with your doctor if you do not begin to respond to the above treatment within the next few days.    GET PROMPT MEDICAL ATTENTION if any of the following occur:      Large amount of rectal bleeding (more than 1 cup of blood in 24 hours)    Increasing rectal pain or rectal pain that continues for more than three days of treatment    Weakness, dizziness or fainting    Vomiting  blood (red or black color)          [id number] [facility], [address] [place] [address], [place] [address]. All rights reserved. This information is not intended as a substitute for professional medical care. Always follow your healthcare professional's instructions.

## 2019-09-04 NOTE — LETTER
August 7, 2019      Saadia Sorensen  [address] [facility] [id number]        Dear Saadia,       Thank you for choosing [facility]. You are scheduled for the following service(s).   Please be aware that coverage of these services is subject to the terms and limitations of your health insurance plan.  Call member services at your health plan with any benefit or coverage questions.    Date:  9/4/2019             Procedure:  COLONOSCOPY  Doctor:         Dr ALLEN   Arrival Time:  8 45 AM  *Check in at Emergency/Endoscopy desk*  Procedure Time:  9 15 AM      Location:   [facility]        Endoscopy Department, First Floor (Enter through ER Doors) *        [address] [place], [place] [address]      [phone] or [phone] () to reschedule      MIRALAX -GATORADE  PREP  Colonoscopy is the most accurate test to detect colon polyps and colon cancer; and the only test where polyps can be removed. During this procedure, a doctor examines the lining of your large intestine and rectum through a flexible tube.   Transportation  You must arrange for a ride for the day of your procedure with a responsible adult. A taxi , Uber, etc, is not an option unless you are accompanied by a responsible adult. If you fail to arrange transportation with a responsible adult, your procedure will be cancelled and rescheduled.    Purchase the  following supplies at your local pharmacy:  - 2 (two) bisacodyl tablets: each tablet contains 5 mg.  (Dulcolax  laxative NOT Dulcolax  stool softener)   - 1 (one) 8.3 oz bottle of Polyethylene Glycol (PEG) 3350 Powder   (MiraLAX , Smooth LAX , ClearLAX  or equivalent)  - 64 oz Gatorade    Regular Gatorade, Gatorade G2 , Powerade , Powerade Zero  or Pedialyte  is acceptable. Red colored flavors are not allowed; all other colors (yellow, green, orange, purple and blue) are okay. It is also okay to buy two 2.12 oz packets of powdered Gatorade  that can be mixed with water to a total volume of 64 oz of liquid.  - 1 (one) 10 oz bottle of Magnesium Citrate (Red colored flavors are not allowed)  It is also okay for you to use a 0.5 oz package of powdered magnesium citrate (17 g) mixed with 10 oz of water.      PREPARATION FOR COLONOSCOPY    7 days before:    Discontinue fiber supplements and medications containing iron. This includes Metamucil  and Fibercon ; and multivitamins with iron.    3 days before:    Begin a low-fiber diet. A low-fiber diet helps making the cleanout more effective.     Examples of a low-fiber diet include (but are not limited to): white bread, white rice, pasta, crackers, fish, chicken, eggs, ground beef, creamy peanut butter, cooked/steamed/boiled vegetables, canned fruit, bananas, melons, milk, plain yogurt cheese, salad dressing and other condiments.     The following are not allowed on a low-fiber diet: seeds, nuts, popcorn, bran, whole wheat, corn, quinoa, raw fruits and vegetables, berries and dried fruit, beans and lentils.    For additional details on low-fiber diet, please refer to the table on the last page.    2 days before:    Continue the low-fiber diet.     Drink at least 8 glasses of water throughout the day.     Stop eating solid foods at 11:45 pm.  1.   2. 1 day before:    In the morning: begin a clear liquid diet (liquids you can see through).     Examples of a clear liquid diet include: water, clear broth or bouillon, Gatorade, Pedialyte or Powerade, carbonated and non-carbonated soft drinks (Sprite , 7-Up , ginger ale), strained fruit juices without pulp (apple, white grape, white cranberry), Jell-O  and popsicles.     The following are not allowed on a clear liquid diet: red liquids, alcoholic beverages, dairy products (milk, creamer, and yogurt), protein shakes, creamy broths, juice with pulp and chewing tobacco.    At noon: take 2 (two) bisacodyl tablets     At 4 (and no later than 6pm): start drinking the  Miralax-Gatorade preparation (8.3 oz of Miralax mixed with 64 oz of Gatorade in a large pitcher). Drink 1(one) 8 oz glass every 15 minutes thereafter, until the mixture is gone.    COLON CLEANSING TIPS: drink adequate amounts of fluids before and after your colon cleansing to prevent dehydration. Stay near a toilet because you will have diarrhea. Even if you are sitting on the toilet, continue to drink the cleansing solution every 15 minutes. If you feel nauseous or vomit, rinse your mouth with water, take a 15 to 30-minute-break and then continue drinking the solution. You will be uncomfortable until the stool has flushed from your colon (in about 2 to 4 hours). You may feel chilled.    Day of your procedure  You may take all of your morning medications including blood pressure medications, blood thinners (if you have not been instructed to stop these by our office), methadone, anti-seizure medications with sips of water 3 hours prior to your procedure or earlier. Do not take insulin or vitamins prior to your procedure. Continue the clear liquid diet.       4 hours prior: drink 10 oz of magnesium citrate. It may be easier to drink it with a straw.    STOP consuming all liquids after that.     Do not take anything by mouth during this time.     Allow extra time to travel to your procedure as you may need to stop and use a restroom along the way.    You are ready for the procedure, if you followed all instructions and your stool is no longer formed, but clear or yellow liquid. If you are unsure whether your colon is clean, please call our office at [phone] before you leave for your appointment.    Bring the following to your procedure:  - Insurance Card/Photo ID.   - List of current medications including over-the-counter medications and supplements.   - Your rescue inhaler if you currently use one to control asthma.      Canceling or rescheduling your appointment:   If you must cancel or reschedule your  appointment, please call [phone] as soon as possible.      COLONOSCOPY PRE-PROCEDURE CHECKLIST    If you have diabetes, ask your regular doctor for diet and medication restrictions.  If you take an anticoagulant or anti-platelet medication (such as Coumadin , Lovenox , Pradaxa , Xarelto , Eliquis , etc.), please call your primary doctor for advice on holding this medication.  If you take aspirin you may continue to do so.  If you are or may be pregnant, please discuss the risks and benefits of this procedure with your doctor.        What happens during a colonoscopy?    Plan to spend up to two hours, starting at registration time, at the endoscopy center the day of your procedure. The colonoscopy takes an average of 15 to 30 minutes. Recovery time is about 30 minutes.      Before the exam:    You will change into a gown.    Your medical history and medication list will be reviewed with you, unless that has been done over the phone prior to the procedure.     A nurse will insert an intravenous (IV) line into your hand or arm.    The doctor will meet with you and will give you a consent form to sign.  1. During the exam:     Medicine will be given through the IV line to help you relax.     Your heart rate and oxygen levels will be monitored. If your blood pressure is low, you may be given fluids through the IV line.     The doctor will insert a flexible hollow tube, called a colonoscope, into your rectum. The scope will be advanced slowly through the large intestine (colon).    You may have a feeling of fullness or pressure.     If an abnormal tissue or a polyp is found, the doctor may remove it through the endoscope for closer examination, or biopsy. Tissue removal is painless    After the exam:           Any tissue samples removed during the exam will be sent to a lab for evaluation. It may take 5-7 working days for you to be notified of the results.     A nurse will provide you with complete discharge  instructions before you leave the endoscopy center. Be sure to ask the nurse for specific instructions if you take blood thinners such as Aspirin, Coumadin or Plavix.     The doctor will prepare a full report for you and for the physician who referred you for the procedure.     Your doctor will talk with you about the initial results of your exam.      Medication given during the exam will prohibit you from driving for the rest of the day.     Following the exam, you may resume your normal diet. Your first meal should be light, no greasy foods. Avoid alcohol until the next day.     You may resume your regular activities the day after the procedure.         LOW-FIBER DIET    Foods RECOMMENDED Foods to AVOID   Breads, Cereal, Rice and Pasta:   White bread, rolls, biscuits, croissant and liz toast.   Waffles, Turkish toast and pancakes.   White rice, noodles, pasta, macaroni and peeled cooked potatoes.   Plain crackers and saltines.   Cooked cereals: farina, cream of rice.   Cold cereals: Puffed Rice , Rice Krispies , Corn Flakes  and Special K    Breads, Cereal, Rice and Pasta:   Breads or rolls with nuts, seeds or fruit.   Whole wheat, pumpernickel, rye breads and cornbread.   Potatoes with skin, brown or wild rice, and kasha (buckwheat).     Vegetables:   Tender cooked and canned vegetables without seeds: carrots, asparagus tips, green or wax beans, pumpkin, spinach, lima beans. Vegetables:   Raw or steamed vegetables.   Vegetables with seeds.   Sauerkraut.   Winter squash, peas, broccoli, Brussel sprouts, cabbage, onions, cauliflower, baked beans, peas and corn.   Fruits:   Strained fruit juice.   Canned fruit, except pineapple.   Ripe bananas and melon. Fruits:   Prunes and prune juice.   Raw fruits.   Dried fruits: figs, dates and raisins.   Milk/Dairy:   Milk: plain or flavored.   Yogurt, custard and ice cream.   Cheese and cottage cheese Milk/Dairy:     Meat and other proteins:   ground, well-cooked tender  beef, lamb, ham, veal, pork, fish, poultry and organ meats.   Eggs.   Peanut butter without nuts. Meat and other proteins:   Tough, fibrous meats with gristle.   Dry beans, peas and lentils.   Peanut butter with nuts.   Tofu.   Fats, Snack, Sweets, Condiments and Beverages:   Margarine, butter, oils, mayonnaise, sour cream and salad dressing, plain gravy.   Sugar, hard candy, clear jelly, honey and syrup.   Spices, cooked herbs, bouillon, broth and soups made with allowed vegetable, ketchup and mustard.   Coffee, tea and carbonated drinks.   Plain cakes, cookies and pretzels.   Gelatin, plain puddings, custard, ice cream, sherbet and popsicles. Fats, Snack, Sweets, Condiments and Beverages:   Nuts, seeds and coconut.   Jam, marmalade and preserves.   Pickles, olives, relish and horseradish.   All desserts containing nuts, seeds, dried fruit and coconut; or made from whole grains or bran.   Candy made with nuts or seeds.   Popcorn.                     DIRECTIONS TO THE ENDOSCOPY DEPARTMENT     From the north ([facility])  Take [address] South, exit on [address] [address]. Get into the left hand vinnie, turn left (east), go one-half mile to [address] and turn left. Go north to the first stoplight, take a right on [address] and follow it to the Emergency entrance.    From the south ([facility])  Take 35N to the 35E split and exit on [address] [address]. On [address] [address], turn left (west) to [address]. Turn right (north) on [address]. Go north to the first stoplight, take a right on [address] and follow it to the Emergency entrance.    From the east via 35E ([facility])  Take [address] south to [address] [address] exit. Turn right on [address] [address]. Go west to [address]. Turn right (north) on [address]. Go to the first stoplight, take a right and follow on [address] to the Emergency entrance.    From the east via [address] ([facility])  [address]  to [address]. Turn left (south) on [address] to [address]. Turn left (east) on [address] and follow it to the Emergency entrance.    From the west via [address] ([place])  Take [address] east to [address]. Turn right (south) on [address] to [address]. Turn left (east) on [address] and follow it to the Emergency entrance.

## 2019-09-05 LAB — COPATH REPORT: NORMAL

## 2020-07-09 ENCOUNTER — HOSPITAL ENCOUNTER (OUTPATIENT)
Dept: CT IMAGING | Facility: CLINIC | Age: 60
Discharge: HOME | End: 2020-07-09
Attending: NURSE PRACTITIONER | Admitting: NURSE PRACTITIONER
Payer: COMMERCIAL

## 2020-07-09 ENCOUNTER — OFFICE VISIT (OUTPATIENT)
Dept: FAMILY MEDICINE | Facility: CLINIC | Age: 60
End: 2020-07-09
Payer: COMMERCIAL

## 2020-07-09 VITALS
SYSTOLIC BLOOD PRESSURE: 123 MMHG | DIASTOLIC BLOOD PRESSURE: 85 MMHG | HEART RATE: 79 BPM | BODY MASS INDEX: 33.04 KG/M2 | WEIGHT: 175 LBS | RESPIRATION RATE: 16 BRPM | TEMPERATURE: 98.1 F | HEIGHT: 61 IN

## 2020-07-09 DIAGNOSIS — R82.90 NONSPECIFIC FINDING ON EXAMINATION OF URINE: ICD-10-CM

## 2020-07-09 DIAGNOSIS — N30.00 ACUTE CYSTITIS WITHOUT HEMATURIA: ICD-10-CM

## 2020-07-09 DIAGNOSIS — R10.9 ACUTE LEFT FLANK PAIN: Primary | ICD-10-CM

## 2020-07-09 DIAGNOSIS — R10.9 FLANK PAIN: ICD-10-CM

## 2020-07-09 DIAGNOSIS — R10.9 ACUTE LEFT FLANK PAIN: ICD-10-CM

## 2020-07-09 LAB
ALBUMIN UR-MCNC: NEGATIVE MG/DL
APPEARANCE UR: CLEAR
BACTERIA #/AREA URNS HPF: ABNORMAL /HPF
BILIRUB UR QL STRIP: NEGATIVE
COLOR UR AUTO: YELLOW
GLUCOSE UR STRIP-MCNC: NEGATIVE MG/DL
HGB UR QL STRIP: NEGATIVE
KETONES UR STRIP-MCNC: NEGATIVE MG/DL
LEUKOCYTE ESTERASE UR QL STRIP: ABNORMAL
MUCOUS THREADS #/AREA URNS LPF: PRESENT /LPF
NITRATE UR QL: NEGATIVE
NON-SQ EPI CELLS #/AREA URNS LPF: ABNORMAL /LPF
PH UR STRIP: 6.5 PH (ref 5–7)
RBC #/AREA URNS AUTO: ABNORMAL /HPF
SOURCE: ABNORMAL
SP GR UR STRIP: 1.02 (ref 1–1.03)
TRANS CELLS #/AREA URNS HPF: ABNORMAL /HPF
UROBILINOGEN UR STRIP-ACNC: 0.2 EU/DL (ref 0.2–1)
WBC #/AREA URNS AUTO: ABNORMAL /HPF

## 2020-07-09 PROCEDURE — 87086 URINE CULTURE/COLONY COUNT: CPT | Performed by: NURSE PRACTITIONER

## 2020-07-09 PROCEDURE — 81001 URINALYSIS AUTO W/SCOPE: CPT | Performed by: NURSE PRACTITIONER

## 2020-07-09 PROCEDURE — 74176 CT ABD & PELVIS W/O CONTRAST: CPT

## 2020-07-09 PROCEDURE — 99214 OFFICE O/P EST MOD 30 MIN: CPT | Performed by: NURSE PRACTITIONER

## 2020-07-09 RX ORDER — NITROFURANTOIN 25; 75 MG/1; MG/1
100 CAPSULE ORAL 2 TIMES DAILY
Qty: 14 CAPSULE | Refills: 0 | Status: SHIPPED | OUTPATIENT
Start: 2020-07-09 | End: 2020-08-06

## 2020-07-09 ASSESSMENT — MIFFLIN-ST. JEOR: SCORE: 1303.23

## 2020-07-09 NOTE — PROGRESS NOTES
Subjective     Saadia Sorensen is a 59 year old female who presents to clinic today for the following health issues:    HPI       Back Pain       Duration: 2 weeks        Specific cause: none    Description:   Location of pain: low back left  Character of pain: sharp  Pain radiation:radiates into the left leg  New numbness or weakness in legs, not attributed to pain:  no     Intensity: Currently 5-6/10, At its worst 10/10    History:   Pain interferes with job: YES  History of back problems: no prior back problems  Any previous MRI or X-rays: None  Sees a specialist for back pain:  No  Therapies tried without relief: NONE    Alleviating factors:   Improved by: Advil      Precipitating factors:  Worsened by: Nothing    Increased fluid intake.  Pain is intermittent, when it is intense feels nauseas.  Last CT in 2015 with stone identified.   Urinary frequency, urgency, pressure, at times has dysuria.  Denies hematuria.       URINARY TRACT SYMPTOMS  Onset: 2 weeks    Description:   Painful urination (Dysuria): no            Frequency: YES  Blood in urine (Hematuria): no   Delay in urine (Hesitancy): no     Intensity:     Progression of Symptoms:  same and constant    Accompanying Signs & Symptoms:  Fever/chills: no   Flank pain YES  Nausea and vomiting: YES  Any vaginal symptoms: none  Abdominal/Pelvic Pain: YES    History:   History of frequent UTI's: YES  History of kidney stones: YES  Sexually Active: YES  Possibility of pregnancy: Yes    Precipitating factors: none        Patient Active Problem List   Diagnosis     Hypercholesteremia     HYPERLIPIDEMIA LDL GOAL <130     Gastritis     History of nephrolithiasis     Family history of colon cancer     Past Surgical History:   Procedure Laterality Date     C NONSPECIFIC PROCEDURE      (L) TM-operation     C NONSPECIFIC PROCEDURE      s/p spAb x 1     COLONOSCOPY       ENT SURGERY      ear drum ruptured and had surgery       Social History     Tobacco Use     Smoking status:  "Never Smoker     Smokeless tobacco: Never Used   Substance Use Topics     Alcohol use: No     Frequency: Never     Binge frequency: Never     Family History   Problem Relation Age of Onset     Cancer Mother         colon cancer, colostomy bag     Lipids Mother      Colon Cancer Mother      Family History Negative Father      Family History Negative Maternal Grandmother      Family History Negative Maternal Grandfather      Family History Negative Paternal Grandmother      Family History Negative Paternal Grandfather      Family History Negative Brother      Family History Negative Sister          No current outpatient medications on file.     BP Readings from Last 3 Encounters:   07/09/20 123/85   09/04/19 106/71   07/17/19 118/78    Wt Readings from Last 3 Encounters:   07/09/20 79.4 kg (175 lb)   09/04/19 76.2 kg (168 lb)   07/17/19 76.2 kg (168 lb)                    Reviewed and updated as needed this visit by Provider         Review of Systems   CONSTITUTIONAL: NEGATIVE for fever, chills, change in weight  RESP: NEGATIVE for significant cough or SOB  CV: NEGATIVE for chest pain, palpitations or peripheral edema  MUSCULOSKELETAL: see HPI  :  See HPI  NEURO: NEGATIVE for weakness, dizziness or paresthesias  PSYCHIATRIC: NEGATIVE for changes in mood or affect      Objective    /85 (BP Location: Left arm, Patient Position: Chair, Cuff Size: Adult Large)   Pulse 79   Temp 98.1  F (36.7  C) (Tympanic)   Resp 16   Ht 1.537 m (5' 0.5\")   Wt 79.4 kg (175 lb)   LMP 07/03/2007   Breastfeeding No   BMI 33.61 kg/m    There is no height or weight on file to calculate BMI.  Physical Exam   GENERAL: healthy, alert and no distress  NECK: no adenopathy, no asymmetry, masses, or scars and thyroid normal to palpation  RESP: lungs clear to auscultation - no rales, rhonchi or wheezes  CV: regular rate and rhythm, normal S1 S2, no S3 or S4, no murmur, click or rub, no peripheral edema and peripheral pulses " "strong  ABDOMEN: soft, suprapubic tenderness, no hepatosplenomegaly, no masses and bowel sounds normal  MS: no gross musculoskeletal defects noted, no edema, left flank pain  PSYCH: mentation appears normal, affect normal/bright          Assessment & Plan   Assessment  Saadia was seen today for back pain.    Diagnoses and all orders for this visit:    Acute left flank pain:  Will obtain CT to further evaluate for nephrolithiasis.    -     *UA reflex to Microscopic and Culture ([place] [facility] (except [place] and [place])  -     Urine Culture Aerobic Bacterial  -     CT Abdomen Pelvis w/o Contrast; Future    Acute cystitis without hematuria:  UA with moderate leuk esterace, will start on macrobid, instructed to increase fluid intake.   -     nitroFURantoin macrocrystal-monohydrate (MACROBID) 100 MG capsule; Take 1 capsule (100 mg) by mouth 2 times daily    Flank pain  -     Urine Microscopic    Nonspecific finding on examination of urine  -     Urine Culture Aerobic Bacterial        BMI:   Estimated body mass index is 33.61 kg/m  as calculated from the following:    Height as of this encounter: 1.537 m (5' 0.5\").    Weight as of this encounter: 79.4 kg (175 lb).   Follow up will call with CT results, take tylenol/ibuprofen for pain.  Follow up in 4-6 week for physical exam, sooner as needed.  Susan Haase, APRN [facility]        "

## 2020-07-10 ENCOUNTER — HOSPITAL ENCOUNTER (OUTPATIENT)
Dept: MAMMOGRAPHY | Facility: CLINIC | Age: 60
Discharge: HOME | End: 2020-07-10
Attending: OBSTETRICS & GYNECOLOGY | Admitting: OBSTETRICS & GYNECOLOGY
Payer: COMMERCIAL

## 2020-07-10 DIAGNOSIS — Z12.31 VISIT FOR SCREENING MAMMOGRAM: ICD-10-CM

## 2020-07-10 LAB
BACTERIA SPEC CULT: NORMAL
SPECIMEN SOURCE: NORMAL

## 2020-07-10 PROCEDURE — 77067 SCR MAMMO BI INCL CAD: CPT

## 2020-07-11 ENCOUNTER — TELEPHONE (OUTPATIENT)
Dept: FAMILY MEDICINE | Facility: CLINIC | Age: 60
End: 2020-07-11

## 2020-07-11 DIAGNOSIS — E78.00 HYPERCHOLESTEREMIA: ICD-10-CM

## 2020-07-11 DIAGNOSIS — Z80.0 FAMILY HISTORY OF COLON CANCER: Primary | ICD-10-CM

## 2020-07-11 DIAGNOSIS — K52.9 NON-SPECIFIC COLITIS: ICD-10-CM

## 2020-07-11 DIAGNOSIS — M54.16 LUMBAR BACK PAIN WITH RADICULOPATHY AFFECTING LEFT LOWER EXTREMITY: ICD-10-CM

## 2020-07-11 DIAGNOSIS — R53.83 FATIGUE, UNSPECIFIED TYPE: ICD-10-CM

## 2020-07-11 NOTE — TELEPHONE ENCOUNTER
Can you please:  1.  Set up Saadia for a lab only appointment at 9:00 on Monday 7/13, she knows about the appt and will be here at that time, the orders are placed.   2.  Call her before 10 am (she works at 11) with the information for [facility] I would like her to see them for follow up regarding the colitis of the sigmoid colon found on the CT scan ( I discussed this with her on the phone Saturday).  3.  Assist her with scheduling an MRI of her lumbar spine, an order has been placed.   Thanks,  Susan Haase, CNP

## 2020-07-11 NOTE — TELEPHONE ENCOUNTER
Spoke with Saadia, informed of CT result of possible infectious or inflammatory colitis of sigmoid colon. Has known constipation, has a stool daily, last BM yesterday, denies blood in stool, feeling that she needs to have a BM, abdominal pain or fever.  Endorses constant nausea for the last 2 weeks.    She did have a colonoscopy completed in 2019 with a sigmoid polyp as well as a family history of a mother with a history of colon cancer.  Based on these results will have her return to clinic on 7/13 for CBC, CBC, CRP, will refer to [facility] for further evaluation as well.  Upon abdominal exam the office did not have any pain in the left lower abdominal area.    In relation to her pain, she continues to have pain in the left back area right below the waist line, unclear etiology?  It doesn't seem that the possible The urine culture returned with no growth, CT without evidence of kidney stone.  Reports that she continues to have actue pain that she rates as 5/10, increases to 10/10 at times, increased after sweeping yesterday.  Has an area of her left back that if she touches it really hurts, this was noted on exam in the office.  Discussed will obtain an MRI for further evaluation.       Is aware of need to go directly to the ED if pain increases, if has any bloody diarrhea or fever.  Susan Haase, CNP

## 2020-07-13 DIAGNOSIS — K52.9 NON-SPECIFIC COLITIS: ICD-10-CM

## 2020-07-13 DIAGNOSIS — R53.83 FATIGUE, UNSPECIFIED TYPE: ICD-10-CM

## 2020-07-13 DIAGNOSIS — E78.00 HYPERCHOLESTEREMIA: ICD-10-CM

## 2020-07-13 LAB
ALBUMIN SERPL-MCNC: 3.7 G/DL (ref 3.4–5)
ALP SERPL-CCNC: 76 U/L (ref 40–150)
ALT SERPL W P-5'-P-CCNC: 19 U/L (ref 0–50)
AMYLASE SERPL-CCNC: 45 U/L (ref 30–110)
ANION GAP SERPL CALCULATED.3IONS-SCNC: 4 MMOL/L (ref 3–14)
AST SERPL W P-5'-P-CCNC: 15 U/L (ref 0–45)
BASOPHILS # BLD AUTO: 0 10E9/L (ref 0–0.2)
BASOPHILS NFR BLD AUTO: 0.3 %
BILIRUB SERPL-MCNC: 0.4 MG/DL (ref 0.2–1.3)
BUN SERPL-MCNC: 13 MG/DL (ref 7–30)
CALCIUM SERPL-MCNC: 8.8 MG/DL (ref 8.5–10.1)
CHLORIDE SERPL-SCNC: 106 MMOL/L (ref 94–109)
CHOLEST SERPL-MCNC: 276 MG/DL
CO2 SERPL-SCNC: 28 MMOL/L (ref 20–32)
CREAT SERPL-MCNC: 0.71 MG/DL (ref 0.52–1.04)
CRP SERPL-MCNC: 5.9 MG/L (ref 0–8)
DIFFERENTIAL METHOD BLD: NORMAL
EOSINOPHIL # BLD AUTO: 0.3 10E9/L (ref 0–0.7)
EOSINOPHIL NFR BLD AUTO: 4.4 %
ERYTHROCYTE [DISTWIDTH] IN BLOOD BY AUTOMATED COUNT: 13.6 % (ref 10–15)
ERYTHROCYTE [SEDIMENTATION RATE] IN BLOOD BY WESTERGREN METHOD: 13 MM/H (ref 0–30)
GFR SERPL CREATININE-BSD FRML MDRD: >90 ML/MIN/{1.73_M2}
GLUCOSE SERPL-MCNC: 100 MG/DL (ref 70–99)
HCT VFR BLD AUTO: 42.9 % (ref 35–47)
HDLC SERPL-MCNC: 50 MG/DL
HGB BLD-MCNC: 13.9 G/DL (ref 11.7–15.7)
LDLC SERPL CALC-MCNC: 203 MG/DL
LIPASE SERPL-CCNC: 74 U/L (ref 73–393)
LYMPHOCYTES # BLD AUTO: 2.2 10E9/L (ref 0.8–5.3)
LYMPHOCYTES NFR BLD AUTO: 32.1 %
MCH RBC QN AUTO: 29.3 PG (ref 26.5–33)
MCHC RBC AUTO-ENTMCNC: 32.4 G/DL (ref 31.5–36.5)
MCV RBC AUTO: 91 FL (ref 78–100)
MONOCYTES # BLD AUTO: 0.4 10E9/L (ref 0–1.3)
MONOCYTES NFR BLD AUTO: 6 %
NEUTROPHILS # BLD AUTO: 4 10E9/L (ref 1.6–8.3)
NEUTROPHILS NFR BLD AUTO: 57.2 %
NONHDLC SERPL-MCNC: 226 MG/DL
PLATELET # BLD AUTO: 240 10E9/L (ref 150–450)
POTASSIUM SERPL-SCNC: 4.1 MMOL/L (ref 3.4–5.3)
PROT SERPL-MCNC: 7.6 G/DL (ref 6.8–8.8)
RBC # BLD AUTO: 4.74 10E12/L (ref 3.8–5.2)
SODIUM SERPL-SCNC: 138 MMOL/L (ref 133–144)
TRIGL SERPL-MCNC: 115 MG/DL
TSH SERPL DL<=0.005 MIU/L-ACNC: 2.1 MU/L (ref 0.4–4)
WBC # BLD AUTO: 7 10E9/L (ref 4–11)

## 2020-07-13 PROCEDURE — 36415 COLL VENOUS BLD VENIPUNCTURE: CPT | Performed by: NURSE PRACTITIONER

## 2020-07-13 PROCEDURE — 83690 ASSAY OF LIPASE: CPT | Performed by: NURSE PRACTITIONER

## 2020-07-13 PROCEDURE — 82150 ASSAY OF AMYLASE: CPT | Performed by: NURSE PRACTITIONER

## 2020-07-13 PROCEDURE — 86140 C-REACTIVE PROTEIN: CPT | Performed by: NURSE PRACTITIONER

## 2020-07-13 PROCEDURE — 80050 GENERAL HEALTH PANEL: CPT | Performed by: NURSE PRACTITIONER

## 2020-07-13 PROCEDURE — 80061 LIPID PANEL: CPT | Performed by: NURSE PRACTITIONER

## 2020-07-13 PROCEDURE — 85652 RBC SED RATE AUTOMATED: CPT | Performed by: NURSE PRACTITIONER

## 2020-07-13 NOTE — TELEPHONE ENCOUNTER
Called pt, discussed, pt provided all numbers to schedule, informed they also will call in 2 days to schedule appointment, discussed Mychart, new code sent, pt plans on signing up for Mychart    GI-If you have not heard from the scheduling office within 2 business days, please call [phone]   FPA: [facility] - Various Locations  Kinza Evans RN, BSN  Message handled by CLINIC NURSE.

## 2020-07-18 ENCOUNTER — TELEPHONE (OUTPATIENT)
Dept: FAMILY MEDICINE | Facility: CLINIC | Age: 60
End: 2020-07-18

## 2020-07-18 DIAGNOSIS — E78.00 HYPERCHOLESTEREMIA: Primary | ICD-10-CM

## 2020-07-18 RX ORDER — PRAVASTATIN SODIUM 20 MG
20 TABLET ORAL DAILY
Qty: 90 TABLET | Refills: 0 | Status: SHIPPED | OUTPATIENT
Start: 2020-07-18 | End: 2020-10-14

## 2020-07-19 NOTE — TELEPHONE ENCOUNTER
Can you please call Saadia and let her know that her labs were normal with the exception of her cholesterol which continues to be quite elevated, see below. I would like her to start on a statin and follow up for recheck labs in 3 months, also see how her pain is?  Thanks,  Susan Haase, CNP [place],  Your lab results are as below:  1)  Labs for gallbladder inflammation (amylase, lipase) are normal.   2)  Cholesterol is elevated at 276,  your LDL (bad cholesterol) is very elevated at 203 and your HDL (good cholesterol) is normal at 50. With this increase it is important that you start on a cholesterol lowering medication or a statin such as pravastatin.    3)  Glucose is normal at 100 (normal fasting is <100).  4)  CBC (checks for anemia and infection) is normal.   5)  TSH (thyroid level) is normal at 2.10.     If you have any questions do not hesitate to call the clinic to discuss the results with me further.     Sincerely,    Susan Haase, CNP

## 2020-07-20 NOTE — TELEPHONE ENCOUNTER
Called patient,    Pain mainly subsided, more uncomfortable now, patient will call to schedule labs closer to 3 months from now    Eric Silver RN

## 2020-07-22 ENCOUNTER — HOSPITAL ENCOUNTER (OUTPATIENT)
Dept: MRI IMAGING | Facility: CLINIC | Age: 60
Discharge: HOME | End: 2020-07-22
Attending: NURSE PRACTITIONER | Admitting: NURSE PRACTITIONER
Payer: COMMERCIAL

## 2020-07-22 DIAGNOSIS — M54.16 LUMBAR BACK PAIN WITH RADICULOPATHY AFFECTING LEFT LOWER EXTREMITY: ICD-10-CM

## 2020-07-22 PROCEDURE — 72148 MRI LUMBAR SPINE W/O DYE: CPT

## 2020-07-24 DIAGNOSIS — M54.16 LUMBAR BACK PAIN WITH RADICULOPATHY AFFECTING LEFT LOWER EXTREMITY: Primary | ICD-10-CM

## 2020-08-06 ENCOUNTER — OFFICE VISIT (OUTPATIENT)
Dept: FAMILY MEDICINE | Facility: CLINIC | Age: 60
End: 2020-08-06
Payer: COMMERCIAL

## 2020-08-06 VITALS
HEIGHT: 62 IN | SYSTOLIC BLOOD PRESSURE: 118 MMHG | BODY MASS INDEX: 32.02 KG/M2 | TEMPERATURE: 98.3 F | WEIGHT: 174 LBS | DIASTOLIC BLOOD PRESSURE: 76 MMHG | OXYGEN SATURATION: 94 % | HEART RATE: 78 BPM

## 2020-08-06 DIAGNOSIS — Z00.00 ROUTINE GENERAL MEDICAL EXAMINATION AT A HEALTH CARE FACILITY: Primary | ICD-10-CM

## 2020-08-06 DIAGNOSIS — Z78.0 MENOPAUSE PRESENT: ICD-10-CM

## 2020-08-06 DIAGNOSIS — Z12.4 CERVICAL CANCER SCREENING: ICD-10-CM

## 2020-08-06 DIAGNOSIS — H00.011 HORDEOLUM EXTERNUM OF RIGHT UPPER EYELID: ICD-10-CM

## 2020-08-06 DIAGNOSIS — Z12.4 SCREENING FOR MALIGNANT NEOPLASM OF CERVIX: ICD-10-CM

## 2020-08-06 PROCEDURE — 87624 HPV HI-RISK TYP POOLED RSLT: CPT | Performed by: NURSE PRACTITIONER

## 2020-08-06 PROCEDURE — G0145 SCR C/V CYTO,THINLAYER,RESCR: HCPCS | Performed by: NURSE PRACTITIONER

## 2020-08-06 PROCEDURE — 99396 PREV VISIT EST AGE 40-64: CPT | Performed by: NURSE PRACTITIONER

## 2020-08-06 RX ORDER — POLYMYXIN B SULFATE AND TRIMETHOPRIM 1; 10000 MG/ML; [USP'U]/ML
1-2 SOLUTION OPHTHALMIC EVERY 4 HOURS
Qty: 10 ML | Refills: 0 | Status: SHIPPED | OUTPATIENT
Start: 2020-08-06 | End: 2020-08-13

## 2020-08-06 ASSESSMENT — ENCOUNTER SYMPTOMS
ABDOMINAL PAIN: 1
CONSTIPATION: 1
FREQUENCY: 1

## 2020-08-06 ASSESSMENT — MIFFLIN-ST. JEOR: SCORE: 1314.57

## 2020-08-06 NOTE — Clinical Note
Please abstract the following data from this visit with this patient into the appropriate field in Epic:    Tests that can be patient reported without a hard copy:    Pap smear done on this date: 12/16/16 (approximately), by this group: Ob/gyn specialist, results were normal/negative.     Other Tests found in the patient's chart through Chart Review/Care Everywhere:    {Abstract Quality List (Optional):943238}    Note to Abstraction: If this section is blank, no results were found via Chart Review/Care Everywhere.

## 2020-08-06 NOTE — PROGRESS NOTES
SUBJECTIVE:   CC: Saadia Sorensen is an 59 year old woman who presents for preventive health visit.     Healthy Habits:     Getting at least 3 servings of Calcium per day:  NO    Bi-annual eye exam:  Yes    Dental care twice a year:  Yes    Sleep apnea or symptoms of sleep apnea:  None    Diet:  Low fat/cholesterol    Frequency of exercise:  2-3 days/week    Duration of exercise:  Less than 15 minutes    Taking medications regularly:  Yes    Medication side effects:  Other    PHQ-2 Total Score: 0    Additional concerns today:  No    Breast cancer screening:  Last mammogram 2020  Colon cancer screenin2019, every 5 years.  Cervical cancer screening:  Last , NIL.  Dexa:  Has never had this completed.   Hyperlipidemia:  Started on pravastatin, has not had any side effects.     Concern - colitis follow up with [facility] 2020.  Denies diarrhea, blood in stool, fever, weight loss.     Today's PHQ-2 Score:   PHQ-2 (  [employer]) 2020   Q1: Little interest or pleasure in doing things 0   Q2: Feeling down, depressed or hopeless 0   PHQ-2 Score 0   Q1: Little interest or pleasure in doing things Not at all   Q2: Feeling down, depressed or hopeless Not at all   PHQ-2 Score 0       Abuse: Current or Past(Physical, Sexual or Emotional)- No  Do you feel safe in your environment? Yes    Have you ever done Advance Care Planning? (For example, a Health Directive, POLST, or a discussion with a medical provider or your loved ones about your wishes): No, advance care planning information given to patient to review.  Patient plans to discuss their wishes with loved ones or provider.      Social History     Tobacco Use     Smoking status: Never Smoker     Smokeless tobacco: Never Used   Substance Use Topics     Alcohol use: No     Frequency: Never     Binge frequency: Never       Alcohol Use 2020   Prescreen: >3 drinks/day or >7 drinks/week? No   Prescreen: >3 drinks/day or >7 drinks/week? -     Reviewed orders with  patient.  Reviewed health maintenance and updated orders accordingly - Yes  Lab work is in process  BP Readings from Last 3 Encounters:   08/06/20 118/76   07/09/20 123/85   09/04/19 106/71    Wt Readings from Last 3 Encounters:   08/06/20 78.9 kg (174 lb)   07/09/20 79.4 kg (175 lb)   09/04/19 76.2 kg (168 lb)                  Patient Active Problem List   Diagnosis     Hypercholesteremia     HYPERLIPIDEMIA LDL GOAL <130     Gastritis     History of nephrolithiasis     Family history of colon cancer     Past Surgical History:   Procedure Laterality Date     C NONSPECIFIC PROCEDURE      (L) TM-operation     C NONSPECIFIC PROCEDURE      s/p spAb x 1     COLONOSCOPY       ENT SURGERY      ear drum ruptured and had surgery       Social History     Tobacco Use     Smoking status: Never Smoker     Smokeless tobacco: Never Used   Substance Use Topics     Alcohol use: No     Frequency: Never     Binge frequency: Never     Family History   Problem Relation Age of Onset     Cancer Mother         colon cancer, colostomy bag     Lipids Mother      Colon Cancer Mother      Family History Negative Father      Family History Negative Maternal Grandmother      Family History Negative Maternal Grandfather      Family History Negative Paternal Grandmother      Family History Negative Paternal Grandfather      Family History Negative Brother      Family History Negative Sister          Current Outpatient Medications   Medication Sig Dispense Refill     pravastatin (PRAVACHOL) 20 MG tablet Take 1 tablet (20 mg) by mouth daily 90 tablet 0       Mammogram Screening: Patient over age 50, mutual decision to screen reflected in health maintenance.    Pertinent mammograms are reviewed under the imaging tab.  History of abnormal Pap smear: NO - age 30- 65 PAP every 3 years recommended     Reviewed and updated as needed this visit by clinical staff  Tobacco  Allergies  Meds  Med Hx  Surg Hx  Fam Hx  Soc Hx        Reviewed and updated as  "needed this visit by Provider            Review of Systems   Gastrointestinal: Positive for abdominal pain and constipation.   Genitourinary: Positive for frequency.     CONSTITUTIONAL: NEGATIVE for fever, chills, change in weight  INTEGUMENTARY/SKIN: NEGATIVE for worrisome rashes, moles or lesions  EYES: NEGATIVE for vision changes or irritation  ENT: NEGATIVE for ear, mouth and throat problems  RESP: NEGATIVE for significant cough or SOB  BREAST: NEGATIVE for masses, tenderness or discharge  CV: NEGATIVE for chest pain, palpitations or peripheral edema  MUSCULOSKELETAL: back pain, seeing a chiropractor.   NEURO: NEGATIVE for weakness, dizziness or paresthesias  PSYCHIATRIC: NEGATIVE for changes in mood or affect      OBJECTIVE:   /76 (BP Location: Right arm, Patient Position: Sitting, Cuff Size: Adult Regular)   Pulse 78   Temp 98.3  F (36.8  C) (Oral)   Ht 1.562 m (5' 1.5\")   Wt 78.9 kg (174 lb)   LMP 07/03/2007   SpO2 94%   BMI 32.34 kg/m    Physical Exam  GENERAL: healthy, alert and no distress  EYES: Eyes grossly normal to inspection, PERRL and conjunctivae and sclerae normal  HENT: ear canals and TM's normal, nose and mouth without ulcers or lesions  NECK: no adenopathy, no asymmetry, masses, or scars and thyroid normal to palpation  RESP: lungs clear to auscultation - no rales, rhonchi or wheezes  BREAST: normal without masses, tenderness or nipple discharge and no palpable axillary masses or adenopathy  CV: regular rate and rhythm, normal S1 S2, no S3 or S4, no murmur, click or rub, no peripheral edema and peripheral pulses strong  :  normal female external genitalia, vaginal mucosa pink, moist, well rugated and normal cervix, adnexae, and uterus without masses. Normal vaginal discharge. No CMT.  ABDOMEN: soft, nontender, no hepatosplenomegaly, no masses and bowel sounds normal  MS: no gross musculoskeletal defects noted, no edema  SKIN: no suspicious lesions or rashes  NEURO: Normal strength " "and tone, mentation intact and speech normal  PSYCH: mentation appears normal, affect normal/bright      ASSESSMENT/PLAN:   Saadia was seen today for physical.    Diagnoses and all orders for this visit:    Routine general medical examination at a health care facility    Cervical cancer screening  -     Pap imaged thin layer screen with HPV - recommended age 30 - 65 years (select HPV order below)  -     HPV High Risk Types DNA Cervical    Menopause present  -     DX Hip/Pelvis/Spine; Future  Possible colitis:  GI appt scheduled for 9/16/2020  COUNSELING:  Reviewed preventive health counseling, as reflected in patient instructions       Regular exercise       Healthy diet/nutrition    Estimated body mass index is 33.61 kg/m  as calculated from the following:    Height as of 7/9/20: 1.537 m (5' 0.5\").    Weight as of 7/9/20: 79.4 kg (175 lb).    Weight management plan: Discussed healthy diet and exercise guidelines     reports that she has never smoked. She has never used smokeless tobacco.      Counseling Resources:  ATP IV Guidelines  Pooled Cohorts Equation Calculator  Breast Cancer Risk Calculator  FRAX Risk Assessment  ICSI Preventive Guidelines  Dietary Guidelines for Americans, 2010  USDA's MyPlate  ASA Prophylaxis  Lung CA Screening  Follow up in 1 year, sooner as needed.   Susan Haase, APRN [facility]"

## 2020-08-07 NOTE — TELEPHONE ENCOUNTER
RECORDS RECEIVED FROM: [facility] Susan Haase, APRN CNP    DATE RECEIVED: 9/16/2020   NOTES STATUS DETAILS   OFFICE NOTE from referring provider Internal 8/6/2020 Office visit with Susan Haase, APRN CNP    OFFICE NOTE from other specialist N/A    DISCHARGE SUMMARY from hospital N/A    OPERATIVE REPORT Internal    MEDICATION LIST Internal         ENDOSCOPY  N/A    COLONOSCOPY Internal 9/4/19, 2/13/13   ERCP N/A    EUS N/A    STOOL TESTING N/A    PERTINENT LABS Internal    PA THOLOGY REPORTS (RELATED) Internal 9/4/19   IMAGING (CT, MRI, EGD) Internal CT Abdomen Pelvis: 7/9/2020, 3/24/15     REFERRAL INFORMATION    Date referral was placed: 9/16/2020   Date all records received: N/A   Date records were scanned into Epic: N/A   Date records were sent to Provider to review: N/A   Date and recommendation received from provider:  LETTER SENT  SCHEDULE APPOINTMENT   Date patient was contacted to schedule: 8/6/2020

## 2020-08-10 LAB
COPATH REPORT: NORMAL
PAP: NORMAL

## 2020-08-12 LAB
FINAL DIAGNOSIS: NORMAL
HPV HR 12 DNA CVX QL NAA+PROBE: NEGATIVE
HPV16 DNA SPEC QL NAA+PROBE: NEGATIVE
HPV18 DNA SPEC QL NAA+PROBE: NEGATIVE
SPECIMEN DESCRIPTION: NORMAL
SPECIMEN SOURCE CVX/VAG CYTO: NORMAL

## 2020-09-16 ENCOUNTER — PRE VISIT (OUTPATIENT)
Dept: GASTROENTEROLOGY | Facility: CLINIC | Age: 60
End: 2020-09-16

## 2020-09-16 ENCOUNTER — VIRTUAL VISIT (OUTPATIENT)
Dept: GASTROENTEROLOGY | Facility: CLINIC | Age: 60
End: 2020-09-16
Payer: COMMERCIAL

## 2020-09-16 VITALS — BODY MASS INDEX: 31.1 KG/M2 | HEIGHT: 62 IN | WEIGHT: 169 LBS

## 2020-09-16 DIAGNOSIS — K52.9 NON-SPECIFIC COLITIS: Primary | ICD-10-CM

## 2020-09-16 DIAGNOSIS — K59.00 CONSTIPATION, UNSPECIFIED CONSTIPATION TYPE: ICD-10-CM

## 2020-09-16 DIAGNOSIS — K29.70 GASTRITIS: Primary | ICD-10-CM

## 2020-09-16 ASSESSMENT — MIFFLIN-ST. JEOR: SCORE: 1294.83

## 2020-09-16 ASSESSMENT — PAIN SCALES - GENERAL: PAINLEVEL: NO PAIN (0)

## 2020-09-16 NOTE — LETTER
9/16/2020       RE: Saadia Sorensen  [address] [place] [id number]     Dear Colleague,    Thank you for referring your patient, Saadia Sorensen, to the [facility] GA[facility] AND IBD CLINIC at [facility]. Please see a copy of my visit note below.    GI CLINIC VISIT    CC/REFERRING MD:  Susan Rachele Haase  REASON FOR CONSULTATION:   Susan Rachele Haase for   Chief Complaint   Patient presents with     New Patient     New BNT.     ASSESSMENT/PLAN:  Ms. Sorensen is a 61 yo woman referred for possible colitis seen on CT a/p.  She has no change in bowel habits and endorses constipation. Her mom did have colon cancer. Saadia had a cscope in 2019 and it showed just one small TA in the sigmoid; next cscope in 2024. I suspect this incidental radiographic finding does not have much clinical significance.     #Questionable colitis on CT scan   ---Obtain fecal calprotectin to check for inflammation.   --------------If elevated, will repeat colonoscopy     # Constipation    --Start OTC Miralax daily     RTC: pending calprotectin result    Nena Mcdermott MD   of Medicine  Division of Gastroenterology, Hepatology and Nutrition  [facility]      HPI  Ms. Sorensen is referred to GI for evaluation of possible colitis that was seen on CT 7/2020. PMHx s/f HLP, kidney stones.  FHx s/f colon cancer in Mom in her 50s/60s.     Has 3-4 BMs per week. This has been her baseline for years. Has occasional hard stools. Occasional sense of incomplete evacuation. Denies excessive straining. No blood in the stool. No changes to bowel habits over the past few years. Occasional left sided abd pain, worse with certain foods (dairy, red meat, etc).     CT a/p on 7/2020 for left flank pains showed:    1.  Questionable mucosal thickening and loss of haustral pattern along  the sigmoid colon could be related to infectious or inflammatory  Colitis.    Labs at that time were normal, including  ESR, CRP, Hgb and albumin.     Cscopes in 2019 and 2013 showed no colitis; see reports below.     ROS:    No fevers or chills  No weight loss  No blurry vision, double vision or change in vision  No sore throat  No lymphadenopathy  No headache, paraesthesias, or weakness in a limb  No shortness of breath or wheezing  No chest pain or pressure  No arthralgias or myalgias  No rashes or skin changes  No odynophagia or dysphagia  No BRBPR, hematochezia, melena  No dysuria, frequency or urgency  No hot/cold intolerance or polyria  No anxiety or depression    PROBLEM LIST  Patient Active Problem List    Diagnosis Date Noted     History of nephrolithiasis 12/11/2015     Priority: Medium     Family history of colon cancer 12/11/2015     Priority: Medium     Mother       Gastritis 12/17/2013     Priority: Medium     HYPERLIPIDEMIA LDL GOAL <130 10/31/2010     Priority: Medium     Hypercholesteremia 12/24/2009     Priority: Medium     PERTINENT PAST MEDICAL HISTORY:  Past Medical History:   Diagnosis Date     Calculus of kidney     x 2     Gastritis 12/17/2013     PREVIOUS SURGERIES:  Past Surgical History:   Procedure Laterality Date     C NONSPECIFIC PROCEDURE      (L) TM-operation     C NONSPECIFIC PROCEDURE      s/p spAb x 1     COLONOSCOPY       ENT SURGERY      ear drum ruptured and had surgery     PREVIOUS ENDOSCOPY:  cscope 9/2019 showed a 5 mm polyp (TA per path) in the sigmoid colon and IH.   cscope 2/2013 showed two 3 mm polyps in the cecum (TA) and descending colon (hyperplastic).     ALLERGIES:  Allergies   Allergen Reactions     Codeine        PERTINENT MEDICATIONS:  Current Outpatient Medications:      pravastatin (PRAVACHOL) 20 MG tablet, Take 1 tablet (20 mg) by mouth daily, Disp: 90 tablet, Rfl: 0    SOCIAL HISTORY:  Social History     Socioeconomic History     Marital status:      Spouse name: Not on file     Number of children: Not on file     Years of education: Not on file     Highest education  "level: Associate degree: occupational, technical, or vocational program   Occupational History     Not on file   Social Needs     Financial resource strain: Not hard at all     Food insecurity     Worry: Never true     Inability: Never true     Transportation needs     Medical: No     Non-medical: No   Tobacco Use     Smoking status: Never Smoker     Smokeless tobacco: Never Used   Substance and Sexual Activity     Alcohol use: No     Frequency: Never     Binge frequency: Never     Drug use: No     Sexual activity: Yes     Partners: Male   Lifestyle     Physical activity     Days per week: 3 days     Minutes per session: 60 min     Stress: Not on file   Relationships     Social connections     Talks on phone: Twice a week     Gets together: Patient refused     Attends Faith service: More than 4 times per year     Active member of club or organization: No     Attends meetings of clubs or organizations: Never     Relationship status:      Intimate partner violence     Fear of current or ex partner: Not on file     Emotionally abused: Not on file     Physically abused: Not on file     Forced sexual activity: Not on file   Other Topics Concern     Parent/sibling w/ CABG, MI or angioplasty before 65F 55M? No   Social History Narrative     Not on file     FAMILY HISTORY:  Family History   Problem Relation Age of Onset     Cancer Mother         colon cancer, colostomy bag     Lipids Mother      Colon Cancer Mother      Family History Negative Father      Family History Negative Maternal Grandmother      Family History Negative Maternal Grandfather      Family History Negative Paternal Grandmother      Family History Negative Paternal Grandfather      Family History Negative Brother      Family History Negative Sister      Past/family/social history reviewed and no changes    PHYSICAL EXAMINATION:  Constitutional: [id number]  Vitals reviewed: Ht 1.575 m (5' 2\")   Wt 76.7 kg (169 lb)   LMP 07/03/2007   BMI 30.91 " kg/m    Wt:   Wt Readings from Last 2 Encounters:   09/16/20 76.7 kg (169 lb)   08/06/20 78.9 kg (174 lb)      PERTINENT STUDIES:  Office Visit on 08/06/2020   Component Date Value Ref Range Status     PAP 08/06/2020 NIL   Final     Copath Report 08/06/2020    Final                    Value:  Patient Name: CLARIBEL DANIELSON  MR#: [id number]  Specimen #: [id number]  Collected: 8/6/2020  Received: 8/7/2020  Reported: 8/10/2020 14:50  Ordering Phy(s): SUSAN RACHELE HAASE    For improved result formatting, select 'View Enhanced Report Format' under   Linked Documents section.    SPECIMEN/STAIN PROCESS:  Pap imaged thin layer prep screening (Surepath, FocalPoint with guided   screening)       Pap-Cyto x 1, HPV ordered x 1    SOURCE: Cervical, endocervical  ----------------------------------------------------------------   Pap imaged thin layer prep screening (Surepath, FocalPoint with guided   screening)  SPECIMEN ADEQUACY:  Satisfactory for evaluation.  -Transformation zone component absent.    CYTOLOGIC INTERPRETATION:    Negative for intraepithelial lesion or malignancy    Electronically signed out by:  ALIS Nowak (ASCP)    CLINICAL HISTORY:  LMP: 7/3/07    Papanicolaou Test Limitations:  Cervical cytology is a screening test with   limited sensitivity; regular  screening                           is critical for cancer prevention; Pap tests are primarily   effective for the diagnosis/prevention of  squamous cell carcinoma, not adenocarcinomas or other cancers.    COLLECTION SITE:  Client:  [facility]  Location: [facility] ([facility])    The technical component of this testing was completed at the [facility], with the professional component performed   at the [facility], [address] [place] [id number] ([phone])         HPV Source 08/06/2020 SurePath   Final     HPV 16 DNA 08/06/2020  Negative  NEG^Negative Final     HPV 18 DNA 08/06/2020 Negative  NEG^Negative Final     Other HR HPV 08/06/2020 Negative  NEG^Negative Final     Final Diagnosis 08/06/2020 This patient's sample is negative for HPV DNA.   Final     Specimen Description 08/06/2020 Cervical Cells   Final       Phone call duration: 21 minutes    Again, thank you for allowing me to participate in the care of your patient.  Sincerely,    Nena Mcdermott MD

## 2020-09-16 NOTE — LETTER
"    9/16/2020         RE: Saadia Sorensen  [address] [address]        Dear Colleague,    Thank you for referring your patient, Saadia Sorensen, to the [facility]. Please see a copy of my visit note below.    Saadia Sorensen is a 60 year old female who is being evaluated via a billable telephone visit.      The patient has been notified of following:     \"This telephone visit will be conducted via a call between you and your physician/provider. We have found that certain health care needs can be provided without the need for a physical exam.  This service lets us provide the care you need with a short phone conversation.  If a prescription is necessary we can send it directly to your pharmacy.  If lab work is needed we can place an order for that and you can then stop by our lab to have the test done at a later time.    Telephone visits are billed at different rates depending on your insurance coverage. During this emergency period, for some insurers they may be billed the same as an in-person visit.  Please reach out to your insurance provider with any questions.    If during the course of the call the physician/provider feels a telephone visit is not appropriate, you will not be charged for this service.\"    Patient has given verbal consent for Telephone visit?  Yes    What phone number would you like to be contacted at? [phone]    How would you [facility] to [facility] your [facility]    GI CLINIC VISIT    CC/REFERRING MD:  Susan Rachele Haase  REASON FOR CONSULTATION:   Susan Rachele Haase for   Chief Complaint   Patient presents with     New Patient     New BNT.       ASSESSMENT/PLAN:  Ms. Sorensen is a 61 yo woman referred for possible colitis seen on CT a/p.  She has no change in bowel habits and endorses constipation. Her mom did have colon cancer. Saadia had a cscope in 2019 and it showed just one small TA in the sigmoid; next cscope in 2024. I suspect this incidental " radiographic finding does not have much clinical significance.     #Questionable colitis on CT scan   ---Obtain fecal calprotectin to check for inflammation.   --------------If elevated, will repeat colonoscopy     # Constipation    --Start OTC Miralax daily     RTC: pending calprotectin result    Nena Mcdermott MD   of Medicine  Division of Gastroenterology, Hepatology and Nutrition  [facility]        HPI    Ms. Sorensen is referred to GI for evaluation of possible colitis that was seen on CT 7/2020. PMHx s/f HLP, kidney stones.  FHx s/f colon cancer in Mom in her 50s/60s.     Has 3-4 BMs per week. This has been her baseline for years. Has occasional hard stools. Occasional sense of incomplete evacuation. Denies excessive straining. No blood in the stool. No changes to bowel habits over the past few years. Occasional left sided abd pain, worse with certain foods (dairy, red meat, etc).     CT a/p on 7/2020 for left flank pains showed:    1.  Questionable mucosal thickening and loss of haustral pattern along  the sigmoid colon could be related to infectious or inflammatory  Colitis.    Labs at that time were normal, including ESR, CRP, Hgb and albumin.     Cscopes in 2019 and 2013 showed no colitis; see reports below.     ROS:    No fevers or chills  No weight loss  No blurry vision, double vision or change in vision  No sore throat  No lymphadenopathy  No headache, paraesthesias, or weakness in a limb  No shortness of breath or wheezing  No chest pain or pressure  No arthralgias or myalgias  No rashes or skin changes  No odynophagia or dysphagia  No BRBPR, hematochezia, melena  No dysuria, frequency or urgency  No hot/cold intolerance or polyria  No anxiety or depression    PROBLEM LIST  Patient Active Problem List    Diagnosis Date Noted     History of nephrolithiasis 12/11/2015     Priority: Medium     Family history of colon cancer 12/11/2015     Priority: Medium     Mother        Gastritis 12/17/2013     Priority: Medium     HYPERLIPIDEMIA LDL GOAL <130 10/31/2010     Priority: Medium     Hypercholesteremia 12/24/2009     Priority: Medium       PERTINENT PAST MEDICAL HISTORY:  Past Medical History:   Diagnosis Date     Calculus of kidney     x 2     Gastritis 12/17/2013       PREVIOUS SURGERIES:  Past Surgical History:   Procedure Laterality Date     C NONSPECIFIC PROCEDURE      (L) TM-operation     C NONSPECIFIC PROCEDURE      s/p spAb x 1     COLONOSCOPY       ENT SURGERY      ear drum ruptured and had surgery       PREVIOUS ENDOSCOPY:  cscope 9/2019 showed a 5 mm polyp (TA per path) in the sigmoid colon and IH.   cscope 2/2013 showed two 3 mm polyps in the cecum (TA) and descending colon (hyperplastic).     ALLERGIES:     Allergies   Allergen Reactions     Codeine        PERTINENT MEDICATIONS:    Current Outpatient Medications:      pravastatin (PRAVACHOL) 20 MG tablet, Take 1 tablet (20 mg) by mouth daily, Disp: 90 tablet, Rfl: 0    SOCIAL HISTORY:  Social History     Socioeconomic History     Marital status:      Spouse name: Not on file     Number of children: Not on file     Years of education: Not on file     Highest education level: Associate degree: occupational, technical, or vocational program   Occupational History     Not on file   Social Needs     Financial resource strain: Not hard at all     Food insecurity     Worry: Never true     Inability: Never true     Transportation needs     Medical: No     Non-medical: No   Tobacco Use     Smoking status: Never Smoker     Smokeless tobacco: Never Used   Substance and Sexual Activity     Alcohol use: No     Frequency: Never     Binge frequency: Never     Drug use: No     Sexual activity: Yes     Partners: Male   Lifestyle     Physical activity     Days per week: 3 days     Minutes per session: 60 min     Stress: Not on file   Relationships     Social connections     Talks on phone: Twice a week     Gets together: Patient  "refused     Attends Scientologist service: More than 4 times per year     Active member of club or organization: No     Attends meetings of clubs or organizations: Never     Relationship status:      Intimate partner violence     Fear of current or ex partner: Not on file     Emotionally abused: Not on file     Physically abused: Not on file     Forced sexual activity: Not on file   Other Topics Concern     Parent/sibling w/ CABG, MI or angioplasty before 65F 55M? No   Social History Narrative     Not on file       FAMILY HISTORY:  Family History   Problem Relation Age of Onset     Cancer Mother         colon cancer, colostomy bag     Lipids Mother      Colon Cancer Mother      Family History Negative Father      Family History Negative Maternal Grandmother      Family History Negative Maternal Grandfather      Family History Negative Paternal Grandmother      Family History Negative Paternal Grandfather      Family History Negative Brother      Family History Negative Sister        Past/family/social history reviewed and no changes    PHYSICAL EXAMINATION:  Constitutional: aaox3  Vitals reviewed: Ht 1.575 m (5' 2\")   Wt 76.7 kg (169 lb)   LMP 07/03/2007   BMI 30.91 kg/m    Wt:   Wt Readings from Last 2 Encounters:   09/16/20 76.7 kg (169 lb)   08/06/20 78.9 kg (174 lb)      PERTINENT STUDIES:    Office Visit on 08/06/2020   Component Date Value Ref Range Status     PAP 08/06/2020 NIL   Final     Copath Report 08/06/2020    Final                    Value:  Patient Name: CLARIBEL DANIELSON  MR#: [id number]  Specimen #: [id number]  Collected: 8/6/2020  Received: 8/7/2020  Reported: 8/10/2020 14:50  Ordering Phy(s): SUSAN RACHELE HAASE    For improved result formatting, select 'View Enhanced Report Format' under   Linked Documents section.    SPECIMEN/STAIN PROCESS:  Pap imaged thin layer prep screening (Surepath, FocalPoint with guided   screening)       Pap-Cyto x 1, HPV ordered x 1    SOURCE: Cervical, " endocervical  ----------------------------------------------------------------   Pap imaged thin layer prep screening (Surepath, FocalPoint with guided   screening)  SPECIMEN ADEQUACY:  Satisfactory for evaluation.  -Transformation zone component absent.    CYTOLOGIC INTERPRETATION:    Negative for intraepithelial lesion or malignancy    Electronically signed out by:  ALIS Nowak (ASCP)    CLINICAL HISTORY:  LMP: 7/3/07    Papanicolaou Test Limitations:  Cervical cytology is a screening test with   limited sensitivity; regular  screening                           is critical for cancer prevention; Pap tests are primarily   effective for the diagnosis/prevention of  squamous cell carcinoma, not adenocarcinomas or other cancers.    COLLECTION SITE:  Client:  [facility]  Location: [facility] ([facility])    The technical component of this testing was completed at the [facility], with the professional component performed   at the [facility], [address] [place] [phone] ([phone])         HPV Source 08/06/2020 SurePath   Final     HPV 16 DNA 08/06/2020 Negative  NEG^Negative Final     HPV 18 DNA 08/06/2020 Negative  NEG^Negative Final     Other HR HPV 08/06/2020 Negative  NEG^Negative Final     Final Diagnosis 08/06/2020 This patient's sample is negative for HPV DNA.   Final     Specimen Description 08/06/2020 Cervical Cells   Final       Phone call duration: 21 minutes      Again, thank you for allowing me to participate in the care of your patient.        Sincerely,        Nena Mcdermott MD

## 2020-09-16 NOTE — PROGRESS NOTES
"Saadia Sorensen is a 60 year old female who is being evaluated via a billable telephone visit.      The patient has been notified of following:     \"This telephone visit will be conducted via a call between you and your physician/provider. We have found that certain health care needs can be provided without the need for a physical exam.  This service lets us provide the care you need with a short phone conversation.  If a prescription is necessary we can send it directly to your pharmacy.  If lab work is needed we can place an order for that and you can then stop by our lab to have the test done at a later time.    Telephone visits are billed at different rates depending on your insurance coverage. During this emergency period, for some insurers they may be billed the same as an in-person visit.  Please reach out to your insurance provider with any questions.    If during the course of the call the physician/provider feels a telephone visit is not appropriate, you will not be charged for this service.\"    Patient has given verbal consent for Telephone visit?  Yes    What phone number would you like to be contacted at? [phone]    How would you like to [facility] your [facility]    GI CLINIC VISIT    CC/REFERRING MD:  Susan Rachele Haase  REASON FOR CONSULTATION:   Susan Rachele Haase for   Chief Complaint   Patient presents with     New Patient     New BNT.       ASSESSMENT/PLAN:  Ms. Sorensen is a 61 yo woman referred for possible colitis seen on CT a/p.  She has no change in bowel habits and endorses constipation. Her mom did have colon cancer. Saadia had a cscope in 2019 and it showed just one small TA in the sigmoid; next cscope in 2024. I suspect this incidental radiographic finding does not have much clinical significance.     #Questionable colitis on CT scan   ---Obtain fecal calprotectin to check for inflammation.   --------------If elevated, will repeat colonoscopy     # Constipation    --Start OTC Miralax daily "     RTC: pending calprotectin result    Nena Mcdermott MD   of Medicine  Division of Gastroenterology, Hepatology and Nutrition  [facility]        HPI    Ms. Sorensen is referred to GI for evaluation of possible colitis that was seen on CT 7/2020. PMHx s/f HLP, kidney stones.  FHx s/f colon cancer in Mom in her 50s/60s.     Has 3-4 BMs per week. This has been her baseline for years. Has occasional hard stools. Occasional sense of incomplete evacuation. Denies excessive straining. No blood in the stool. No changes to bowel habits over the past few years. Occasional left sided abd pain, worse with certain foods (dairy, red meat, etc).     CT a/p on 7/2020 for left flank pains showed:    1.  Questionable mucosal thickening and loss of haustral pattern along  the sigmoid colon could be related to infectious or inflammatory  Colitis.    Labs at that time were normal, including ESR, CRP, Hgb and albumin.     Cscopes in 2019 and 2013 showed no colitis; see reports below.     ROS:    No fevers or chills  No weight loss  No blurry vision, double vision or change in vision  No sore throat  No lymphadenopathy  No headache, paraesthesias, or weakness in a limb  No shortness of breath or wheezing  No chest pain or pressure  No arthralgias or myalgias  No rashes or skin changes  No odynophagia or dysphagia  No BRBPR, hematochezia, melena  No dysuria, frequency or urgency  No hot/cold intolerance or polyria  No anxiety or depression    PROBLEM LIST  Patient Active Problem List    Diagnosis Date Noted     History of nephrolithiasis 12/11/2015     Priority: Medium     Family history of colon cancer 12/11/2015     Priority: Medium     Mother       Gastritis 12/17/2013     Priority: Medium     HYPERLIPIDEMIA LDL GOAL <130 10/31/2010     Priority: Medium     Hypercholesteremia 12/24/2009     Priority: Medium       PERTINENT PAST MEDICAL HISTORY:  Past Medical History:   Diagnosis Date     Calculus of kidney      x 2     Gastritis 12/17/2013       PREVIOUS SURGERIES:  Past Surgical History:   Procedure Laterality Date     C NONSPECIFIC PROCEDURE      (L) TM-operation     C NONSPECIFIC PROCEDURE      s/p spAb x 1     COLONOSCOPY       ENT SURGERY      ear drum ruptured and had surgery       PREVIOUS ENDOSCOPY:  cscope 9/2019 showed a 5 mm polyp (TA per path) in the sigmoid colon and IH.   cscope 2/2013 showed two 3 mm polyps in the cecum (TA) and descending colon (hyperplastic).     ALLERGIES:     Allergies   Allergen Reactions     Codeine        PERTINENT MEDICATIONS:    Current Outpatient Medications:      pravastatin (PRAVACHOL) 20 MG tablet, Take 1 tablet (20 mg) by mouth daily, Disp: 90 tablet, Rfl: 0    SOCIAL HISTORY:  Social History     Socioeconomic History     Marital status:      Spouse name: Not on file     Number of children: Not on file     Years of education: Not on file     Highest education level: Associate degree: occupational, technical, or vocational program   Occupational History     Not on file   Social Needs     Financial resource strain: Not hard at all     Food insecurity     Worry: Never true     Inability: Never true     Transportation needs     Medical: No     Non-medical: No   Tobacco Use     Smoking status: Never Smoker     Smokeless tobacco: Never Used   Substance and Sexual Activity     Alcohol use: No     Frequency: Never     Binge frequency: Never     Drug use: No     Sexual activity: Yes     Partners: Male   Lifestyle     Physical activity     Days per week: 3 days     Minutes per session: 60 min     Stress: Not on file   Relationships     Social connections     Talks on phone: Twice a week     Gets together: Patient refused     Attends Sabianism service: More than 4 times per year     Active member of club or organization: No     Attends meetings of clubs or organizations: Never     Relationship status:      Intimate partner violence     Fear of current or ex partner: Not  "on file     Emotionally abused: Not on file     Physically abused: Not on file     Forced sexual activity: Not on file   Other Topics Concern     Parent/sibling w/ CABG, MI or angioplasty before 65F 55M? No   Social History Narrative     Not on file       FAMILY HISTORY:  Family History   Problem Relation Age of Onset     Cancer Mother         colon cancer, colostomy bag     Lipids Mother      Colon Cancer Mother      Family History Negative Father      Family History Negative Maternal Grandmother      Family History Negative Maternal Grandfather      Family History Negative Paternal Grandmother      Family History Negative Paternal Grandfather      Family History Negative Brother      Family History Negative Sister        Past/family/social history reviewed and no changes    PHYSICAL EXAMINATION:  Constitutional: aaox3  Vitals reviewed: Ht 1.575 m (5' 2\")   Wt 76.7 kg (169 lb)   LMP 07/03/2007   BMI 30.91 kg/m    Wt:   Wt Readings from Last 2 Encounters:   09/16/20 76.7 kg (169 lb)   08/06/20 78.9 kg (174 lb)      PERTINENT STUDIES:    Office Visit on 08/06/2020   Component Date Value Ref Range Status     PAP 08/06/2020 NIL   Final     Copath Report 08/06/2020    Final                    Value:  Patient Name: CLARIBEL DANIELSON  MR#: [id number]  Specimen #: [id number]  Collected: 8/6/2020  Received: 8/7/2020  Reported: 8/10/2020 14:50  Ordering Phy(s): SUSAN RACHELE HAASE    For improved result formatting, select 'View Enhanced Report Format' under   Linked Documents section.    SPECIMEN/STAIN PROCESS:  Pap imaged thin layer prep screening (Surepath, FocalPoint with guided   screening)       Pap-Cyto x 1, HPV ordered x 1    SOURCE: Cervical, endocervical  ----------------------------------------------------------------   Pap imaged thin layer prep screening (Surepath, FocalPoint with guided   screening)  SPECIMEN ADEQUACY:  Satisfactory for evaluation.  -Transformation zone component absent.    CYTOLOGIC " INTERPRETATION:    Negative for intraepithelial lesion or malignancy    Electronically signed out by:  ALIS Nowak (ASCP)    CLINICAL HISTORY:  LMP: 7/3/07    Papanicolaou Test Limitations:  Cervical cytology is a screening test with   limited sensitivity; regular  screening                           is critical for cancer prevention; Pap tests are primarily   effective for the diagnosis/prevention of  squamous cell carcinoma, not adenocarcinomas or other cancers.    COLLECTION SITE:  Client:  [facility]  Location: [facility] ([facility])    The technical component of this testing was completed at the [facility], with the professional component performed   at the [facility], [address] [place] [phone] ([phone])         HPV Source 08/06/2020 SurePath   Final     HPV 16 DNA 08/06/2020 Negative  NEG^Negative Final     HPV 18 DNA 08/06/2020 Negative  NEG^Negative Final     Other HR HPV 08/06/2020 Negative  NEG^Negative Final     Final Diagnosis 08/06/2020 This patient's sample is negative for HPV DNA.   Final     Specimen Description 08/06/2020 Cervical Cells   Final       Phone call duration: 21 minutes

## 2020-09-16 NOTE — NURSING NOTE
"Chief Complaint   Patient presents with     New Patient     New BNT.       Vitals:    09/16/20 1258   Weight: 76.7 kg (169 lb)   Height: 1.575 m (5' 2\")       Body mass index is 30.91 kg/m .         Elizabeth Win CMA    "

## 2020-09-16 NOTE — PATIENT INSTRUCTIONS
PLAN    #Questionable colitis on CT scan   ---Obtain fecal calprotectin to check for inflammation.   --------------If elevated, will repeat colonoscopy     # Constipation    --Start OTC Miralax daily

## 2020-09-22 ENCOUNTER — OFFICE VISIT (OUTPATIENT)
Dept: FAMILY MEDICINE | Facility: CLINIC | Age: 60
End: 2020-09-22
Payer: COMMERCIAL

## 2020-09-22 VITALS
TEMPERATURE: 98.2 F | HEIGHT: 62 IN | RESPIRATION RATE: 18 BRPM | OXYGEN SATURATION: 98 % | SYSTOLIC BLOOD PRESSURE: 120 MMHG | BODY MASS INDEX: 30.73 KG/M2 | WEIGHT: 167 LBS | HEART RATE: 80 BPM | DIASTOLIC BLOOD PRESSURE: 70 MMHG

## 2020-09-22 DIAGNOSIS — N89.8 VAGINAL DISCHARGE: ICD-10-CM

## 2020-09-22 DIAGNOSIS — R30.0 DYSURIA: Primary | ICD-10-CM

## 2020-09-22 LAB
ALBUMIN UR-MCNC: NEGATIVE MG/DL
APPEARANCE UR: CLEAR
BACTERIA #/AREA URNS HPF: ABNORMAL /HPF
BILIRUB UR QL STRIP: NEGATIVE
COLOR UR AUTO: YELLOW
GLUCOSE UR STRIP-MCNC: NEGATIVE MG/DL
HGB UR QL STRIP: ABNORMAL
KETONES UR STRIP-MCNC: NEGATIVE MG/DL
LEUKOCYTE ESTERASE UR QL STRIP: ABNORMAL
NITRATE UR QL: NEGATIVE
NON-SQ EPI CELLS #/AREA URNS LPF: ABNORMAL /LPF
PH UR STRIP: 7 PH (ref 5–7)
RBC #/AREA URNS AUTO: ABNORMAL /HPF
SOURCE: ABNORMAL
SP GR UR STRIP: 1.02 (ref 1–1.03)
SPECIMEN SOURCE: NORMAL
TRANS CELLS #/AREA URNS HPF: ABNORMAL /HPF
UROBILINOGEN UR STRIP-ACNC: 0.2 EU/DL (ref 0.2–1)
WBC #/AREA URNS AUTO: ABNORMAL /HPF
WET PREP SPEC: NORMAL

## 2020-09-22 PROCEDURE — 81001 URINALYSIS AUTO W/SCOPE: CPT | Performed by: NURSE PRACTITIONER

## 2020-09-22 PROCEDURE — 99213 OFFICE O/P EST LOW 20 MIN: CPT | Performed by: NURSE PRACTITIONER

## 2020-09-22 PROCEDURE — 87210 SMEAR WET MOUNT SALINE/INK: CPT | Performed by: NURSE PRACTITIONER

## 2020-09-22 PROCEDURE — 87086 URINE CULTURE/COLONY COUNT: CPT | Performed by: NURSE PRACTITIONER

## 2020-09-22 RX ORDER — NITROFURANTOIN 25; 75 MG/1; MG/1
100 CAPSULE ORAL 2 TIMES DAILY
Qty: 14 CAPSULE | Refills: 0 | Status: SHIPPED | OUTPATIENT
Start: 2020-09-22 | End: 2020-09-29

## 2020-09-22 RX ORDER — PHENAZOPYRIDINE HYDROCHLORIDE 200 MG/1
200 TABLET, FILM COATED ORAL 3 TIMES DAILY PRN
Qty: 15 TABLET | Refills: 0 | Status: SHIPPED | OUTPATIENT
Start: 2020-09-22 | End: 2022-06-27

## 2020-09-22 ASSESSMENT — PAIN SCALES - GENERAL: PAINLEVEL: NO PAIN (0)

## 2020-09-22 ASSESSMENT — MIFFLIN-ST. JEOR: SCORE: 1285.76

## 2020-09-23 LAB
BACTERIA SPEC CULT: NORMAL
SPECIMEN SOURCE: NORMAL

## 2020-09-23 NOTE — RESULT ENCOUNTER NOTE
Hi Saadia,  Your urine culture came back normal, no urinary tract infection.  If your symptoms have not improved please let me know.  Sincerely,     Susan Haase, CNP

## 2020-10-14 DIAGNOSIS — E78.00 HYPERCHOLESTEREMIA: ICD-10-CM

## 2020-10-14 DIAGNOSIS — K29.70 GASTRITIS: ICD-10-CM

## 2020-10-14 LAB
ALBUMIN SERPL-MCNC: 3.5 G/DL (ref 3.4–5)
ALP SERPL-CCNC: 78 U/L (ref 40–150)
ALT SERPL W P-5'-P-CCNC: 22 U/L (ref 0–50)
ANION GAP SERPL CALCULATED.3IONS-SCNC: 4 MMOL/L (ref 3–14)
AST SERPL W P-5'-P-CCNC: 16 U/L (ref 0–45)
BILIRUB SERPL-MCNC: 0.4 MG/DL (ref 0.2–1.3)
BUN SERPL-MCNC: 13 MG/DL (ref 7–30)
CALCIUM SERPL-MCNC: 8.5 MG/DL (ref 8.5–10.1)
CHLORIDE SERPL-SCNC: 107 MMOL/L (ref 94–109)
CHOLEST SERPL-MCNC: 207 MG/DL
CO2 SERPL-SCNC: 26 MMOL/L (ref 20–32)
CREAT SERPL-MCNC: 0.72 MG/DL (ref 0.52–1.04)
GFR SERPL CREATININE-BSD FRML MDRD: >90 ML/MIN/{1.73_M2}
GLUCOSE SERPL-MCNC: 90 MG/DL (ref 70–99)
HDLC SERPL-MCNC: 47 MG/DL
LDLC SERPL CALC-MCNC: 141 MG/DL
NONHDLC SERPL-MCNC: 160 MG/DL
POTASSIUM SERPL-SCNC: 3.8 MMOL/L (ref 3.4–5.3)
PROT SERPL-MCNC: 7.4 G/DL (ref 6.8–8.8)
SODIUM SERPL-SCNC: 137 MMOL/L (ref 133–144)
TRIGL SERPL-MCNC: 95 MG/DL

## 2020-10-14 PROCEDURE — 83993 ASSAY FOR CALPROTECTIN FECAL: CPT | Performed by: INTERNAL MEDICINE

## 2020-10-14 PROCEDURE — 80061 LIPID PANEL: CPT | Performed by: NURSE PRACTITIONER

## 2020-10-14 PROCEDURE — 80053 COMPREHEN METABOLIC PANEL: CPT | Performed by: NURSE PRACTITIONER

## 2020-10-14 PROCEDURE — 36415 COLL VENOUS BLD VENIPUNCTURE: CPT | Performed by: NURSE PRACTITIONER

## 2020-10-14 RX ORDER — PRAVASTATIN SODIUM 20 MG
20 TABLET ORAL DAILY
Qty: 90 TABLET | Refills: 3 | Status: SHIPPED | OUTPATIENT
Start: 2020-10-14 | End: 2022-06-27

## 2020-10-15 NOTE — RESULT ENCOUNTER NOTE
Aries Zee,  Your lab results are as below:  1) Cholesterol has improved and is now 207,  your LDL (bad cholesterol) is much better at 141 and your HDL (good cholesterol) is within normal range. Continue to take pravastatin  and we will recheck this in 1 year.  2)  Glucose is normal at 90 (normal fasting is <100).    If you have any questions do not hesitate to call the clinic to discuss the results with me further.     Sincerely,    Susan Haase, CNP

## 2020-10-16 LAB — CALPROTECTIN STL-MCNT: 41.1 MG/KG (ref 0–49.9)

## 2020-10-28 ENCOUNTER — ANCILLARY PROCEDURE (OUTPATIENT)
Dept: BONE DENSITY | Facility: CLINIC | Age: 60
End: 2020-10-28
Attending: NURSE PRACTITIONER
Payer: COMMERCIAL

## 2020-10-28 DIAGNOSIS — Z78.0 MENOPAUSE PRESENT: ICD-10-CM

## 2020-10-30 PROBLEM — M85.88 OSTEOPENIA OF LUMBAR SPINE: Status: ACTIVE | Noted: 2020-10-30

## 2020-10-31 NOTE — RESULT ENCOUNTER NOTE
Hi Saadia,  Your DEXA scan shows that you have osteopenia.  It is important that you take calcium 1,500 mg every day and vitamin D 800 international unit(s) every day.  Please follow up in 5 years for a repeat scan.  Sincerely,  Susan Haase, CNP

## 2021-01-15 ENCOUNTER — HEALTH MAINTENANCE LETTER (OUTPATIENT)
Age: 61
End: 2021-01-15

## 2021-07-21 ENCOUNTER — HOSPITAL ENCOUNTER (OUTPATIENT)
Dept: MAMMOGRAPHY | Facility: CLINIC | Age: 61
Discharge: HOME | End: 2021-07-21
Attending: OBSTETRICS & GYNECOLOGY | Admitting: OBSTETRICS & GYNECOLOGY
Payer: COMMERCIAL

## 2021-07-21 DIAGNOSIS — Z12.31 VISIT FOR SCREENING MAMMOGRAM: ICD-10-CM

## 2021-07-21 PROCEDURE — 77063 BREAST TOMOSYNTHESIS BI: CPT

## 2021-09-04 ENCOUNTER — HEALTH MAINTENANCE LETTER (OUTPATIENT)
Age: 61
End: 2021-09-04

## 2021-10-30 ENCOUNTER — HEALTH MAINTENANCE LETTER (OUTPATIENT)
Age: 61
End: 2021-10-30

## 2021-11-03 ENCOUNTER — OFFICE VISIT (OUTPATIENT)
Dept: URGENT CARE | Facility: URGENT CARE | Age: 61
End: 2021-11-03
Payer: COMMERCIAL

## 2021-11-03 VITALS
WEIGHT: 160 LBS | BODY MASS INDEX: 29.26 KG/M2 | TEMPERATURE: 98.4 F | HEART RATE: 81 BPM | DIASTOLIC BLOOD PRESSURE: 88 MMHG | RESPIRATION RATE: 16 BRPM | SYSTOLIC BLOOD PRESSURE: 132 MMHG | OXYGEN SATURATION: 97 %

## 2021-11-03 DIAGNOSIS — H66.001 ACUTE SUPPURATIVE OTITIS MEDIA OF RIGHT EAR WITHOUT SPONTANEOUS RUPTURE OF TYMPANIC MEMBRANE, RECURRENCE NOT SPECIFIED: Primary | ICD-10-CM

## 2021-11-03 PROCEDURE — 99213 OFFICE O/P EST LOW 20 MIN: CPT | Performed by: FAMILY MEDICINE

## 2021-11-03 NOTE — PROGRESS NOTES
Assessment & Plan     Acute suppurative otitis media of right ear without spontaneous rupture of tympanic membrane, recurrence not specified    - amoxicillin-clavulanate (AUGMENTIN) 875-125 MG tablet; Take 1 tablet by mouth 2 times daily for 10 days    Will treat with Augmentin.  Consider Sudafed prior to flights to help prevent pain prn.  Close Follow-up if no change or worsening sx prn.    Laurel Valenzuela MD  [facility] AMBER Zee is a 61 year old who presents for the following health issues     HPI     Increasing B ear pain- hx of recurrent OM as child.  Traveling to [facility] to see mom who is ill-- wants to check ears before she flies.    Negative COVID test on Monday.    No fever or chills or cough.  Has had more sinus congestion and migraine HAs.      Review of Systems   Constitutional, HEENT, cardiovascular, pulmonary, GI, , musculoskeletal, neuro, skin, endocrine and psych systems are negative, except as otherwise noted.      Objective    /88 (BP Location: Right arm, Patient Position: Sitting, Cuff Size: Adult Regular)   Pulse 81   Temp 98.4  F (36.9  C) (Oral)   Resp 16   Wt 72.6 kg (160 lb)   LMP 07/03/2007   SpO2 97%   BMI 29.26 kg/m    Body mass index is 29.26 kg/m .  Physical Exam   GENERAL: healthy, alert and no distress  EYES: Eyes grossly normal to inspection, PERRL and conjunctivae and sclerae normal  HENT: normal cephalic/atraumatic, both ears: erythematous and bulging membrane, nose and mouth without ulcers or lesions, oropharynx clear and oral mucous membranes moist  NECK: no adenopathy, no asymmetry, masses, or scars and thyroid normal to palpation  RESP: lungs clear to auscultation - no rales, rhonchi or wheezes  CV: regular rate and rhythm, normal S1 S2, no S3 or S4, no murmur, click or rub, no peripheral edema and peripheral pulses strong  ABDOMEN: soft, nontender, no hepatosplenomegaly, no masses and bowel sounds normal  MS: no  gross musculoskeletal defects noted, no edema  PSYCH: mentation appears normal, affect normal/bright

## 2022-06-27 ENCOUNTER — NURSE TRIAGE (OUTPATIENT)
Dept: NURSING | Facility: CLINIC | Age: 62
End: 2022-06-27

## 2022-06-27 ENCOUNTER — HOSPITAL ENCOUNTER (OUTPATIENT)
Facility: CLINIC | Age: 62
Setting detail: OBSERVATION
Discharge: HOME | End: 2022-06-28
Attending: EMERGENCY MEDICINE | Admitting: INTERNAL MEDICINE
Payer: COMMERCIAL

## 2022-06-27 DIAGNOSIS — K92.2 UPPER GI BLEED: ICD-10-CM

## 2022-06-27 DIAGNOSIS — D62 ANEMIA DUE TO BLOOD LOSS, ACUTE: ICD-10-CM

## 2022-06-27 LAB
ABO/RH(D): NORMAL
ALBUMIN SERPL-MCNC: 3.3 G/DL (ref 3.4–5)
ALP SERPL-CCNC: 62 U/L (ref 40–150)
ALT SERPL W P-5'-P-CCNC: 17 U/L (ref 0–50)
ANION GAP SERPL CALCULATED.3IONS-SCNC: 5 MMOL/L (ref 3–14)
ANTIBODY SCREEN: NEGATIVE
AST SERPL W P-5'-P-CCNC: 14 U/L (ref 0–45)
BASOPHILS # BLD AUTO: 0 10E3/UL (ref 0–0.2)
BASOPHILS NFR BLD AUTO: 0 %
BILIRUB DIRECT SERPL-MCNC: <0.1 MG/DL (ref 0–0.2)
BILIRUB SERPL-MCNC: 0.3 MG/DL (ref 0.2–1.3)
BUN SERPL-MCNC: 22 MG/DL (ref 7–30)
CALCIUM SERPL-MCNC: 8.7 MG/DL (ref 8.5–10.1)
CHLORIDE BLD-SCNC: 109 MMOL/L (ref 94–109)
CO2 SERPL-SCNC: 27 MMOL/L (ref 20–32)
CREAT SERPL-MCNC: 0.66 MG/DL (ref 0.52–1.04)
EOSINOPHIL # BLD AUTO: 0.2 10E3/UL (ref 0–0.7)
EOSINOPHIL NFR BLD AUTO: 3 %
ERYTHROCYTE [DISTWIDTH] IN BLOOD BY AUTOMATED COUNT: 13.2 % (ref 10–15)
GFR SERPL CREATININE-BSD FRML MDRD: >90 ML/MIN/1.73M2
GLUCOSE BLD-MCNC: 129 MG/DL (ref 70–99)
HCT VFR BLD AUTO: 31.1 % (ref 35–47)
HGB BLD-MCNC: 9.2 G/DL (ref 11.7–15.7)
HGB BLD-MCNC: 9.8 G/DL (ref 11.7–15.7)
HOLD SPECIMEN: NORMAL
HOLD SPECIMEN: NORMAL
IMM GRANULOCYTES # BLD: 0 10E3/UL
IMM GRANULOCYTES NFR BLD: 1 %
INR PPP: 1.09 (ref 0.85–1.15)
IRON SATN MFR SERPL: 29 % (ref 15–46)
IRON SERPL-MCNC: 67 UG/DL (ref 35–180)
LYMPHOCYTES # BLD AUTO: 3 10E3/UL (ref 0.8–5.3)
LYMPHOCYTES NFR BLD AUTO: 41 %
MCH RBC QN AUTO: 29.3 PG (ref 26.5–33)
MCHC RBC AUTO-ENTMCNC: 31.5 G/DL (ref 31.5–36.5)
MCV RBC AUTO: 93 FL (ref 78–100)
MONOCYTES # BLD AUTO: 0.3 10E3/UL (ref 0–1.3)
MONOCYTES NFR BLD AUTO: 4 %
NEUTROPHILS # BLD AUTO: 3.6 10E3/UL (ref 1.6–8.3)
NEUTROPHILS NFR BLD AUTO: 51 %
NRBC # BLD AUTO: 0 10E3/UL
NRBC BLD AUTO-RTO: 0 /100
PLATELET # BLD AUTO: 170 10E3/UL (ref 150–450)
POTASSIUM BLD-SCNC: 3.7 MMOL/L (ref 3.4–5.3)
PROT SERPL-MCNC: 7.6 G/DL (ref 6.8–8.8)
RBC # BLD AUTO: 3.35 10E6/UL (ref 3.8–5.2)
SARS-COV-2 RNA RESP QL NAA+PROBE: NEGATIVE
SODIUM SERPL-SCNC: 141 MMOL/L (ref 133–144)
SPECIMEN EXPIRATION DATE: NORMAL
TIBC SERPL-MCNC: 231 UG/DL (ref 240–430)
TROPONIN I SERPL HS-MCNC: 3 NG/L
WBC # BLD AUTO: 7.1 10E3/UL (ref 4–11)

## 2022-06-27 PROCEDURE — 258N000003 HC RX IP 258 OP 636: Performed by: EMERGENCY MEDICINE

## 2022-06-27 PROCEDURE — U0003 INFECTIOUS AGENT DETECTION BY NUCLEIC ACID (DNA OR RNA); SEVERE ACUTE RESPIRATORY SYNDROME CORONAVIRUS 2 (SARS-COV-2) (CORONAVIRUS DISEASE [COVID-19]), AMPLIFIED PROBE TECHNIQUE, MAKING USE OF HIGH THROUGHPUT TECHNOLOGIES AS DESCRIBED BY CMS-2020-01-R: HCPCS | Performed by: EMERGENCY MEDICINE

## 2022-06-27 PROCEDURE — 85018 HEMOGLOBIN: CPT | Mod: 91 | Performed by: PHYSICIAN ASSISTANT

## 2022-06-27 PROCEDURE — G0378 HOSPITAL OBSERVATION PER HR: HCPCS

## 2022-06-27 PROCEDURE — C9803 HOPD COVID-19 SPEC COLLECT: HCPCS

## 2022-06-27 PROCEDURE — 96366 THER/PROPH/DIAG IV INF ADDON: CPT

## 2022-06-27 PROCEDURE — 83550 IRON BINDING TEST: CPT | Performed by: PHYSICIAN ASSISTANT

## 2022-06-27 PROCEDURE — 36415 COLL VENOUS BLD VENIPUNCTURE: CPT | Performed by: PHYSICIAN ASSISTANT

## 2022-06-27 PROCEDURE — 84484 ASSAY OF TROPONIN QUANT: CPT | Performed by: EMERGENCY MEDICINE

## 2022-06-27 PROCEDURE — 85004 AUTOMATED DIFF WBC COUNT: CPT | Performed by: EMERGENCY MEDICINE

## 2022-06-27 PROCEDURE — 82248 BILIRUBIN DIRECT: CPT | Performed by: EMERGENCY MEDICINE

## 2022-06-27 PROCEDURE — C9113 INJ PANTOPRAZOLE SODIUM, VIA: HCPCS | Performed by: PHYSICIAN ASSISTANT

## 2022-06-27 PROCEDURE — 258N000003 HC RX IP 258 OP 636: Performed by: PHYSICIAN ASSISTANT

## 2022-06-27 PROCEDURE — 96365 THER/PROPH/DIAG IV INF INIT: CPT

## 2022-06-27 PROCEDURE — 250N000011 HC RX IP 250 OP 636: Performed by: PHYSICIAN ASSISTANT

## 2022-06-27 PROCEDURE — 96376 TX/PRO/DX INJ SAME DRUG ADON: CPT

## 2022-06-27 PROCEDURE — 93005 ELECTROCARDIOGRAM TRACING: CPT

## 2022-06-27 PROCEDURE — 250N000011 HC RX IP 250 OP 636: Performed by: EMERGENCY MEDICINE

## 2022-06-27 PROCEDURE — 99220 PR INITIAL OBSERVATION CARE,LEVEL III: CPT | Performed by: PHYSICIAN ASSISTANT

## 2022-06-27 PROCEDURE — 96361 HYDRATE IV INFUSION ADD-ON: CPT

## 2022-06-27 PROCEDURE — 80053 COMPREHEN METABOLIC PANEL: CPT | Performed by: EMERGENCY MEDICINE

## 2022-06-27 PROCEDURE — 86850 RBC ANTIBODY SCREEN: CPT | Performed by: EMERGENCY MEDICINE

## 2022-06-27 PROCEDURE — 86901 BLOOD TYPING SEROLOGIC RH(D): CPT | Performed by: EMERGENCY MEDICINE

## 2022-06-27 PROCEDURE — 85610 PROTHROMBIN TIME: CPT | Performed by: EMERGENCY MEDICINE

## 2022-06-27 PROCEDURE — 36415 COLL VENOUS BLD VENIPUNCTURE: CPT | Performed by: EMERGENCY MEDICINE

## 2022-06-27 PROCEDURE — 99285 EMERGENCY DEPT VISIT HI MDM: CPT | Mod: 25

## 2022-06-27 PROCEDURE — C9113 INJ PANTOPRAZOLE SODIUM, VIA: HCPCS | Performed by: EMERGENCY MEDICINE

## 2022-06-27 RX ORDER — IBUPROFEN 200 MG
400 TABLET ORAL EVERY 6 HOURS PRN
Status: ON HOLD | COMMUNITY
End: 2022-06-28

## 2022-06-27 RX ORDER — ONDANSETRON 2 MG/ML
4 INJECTION INTRAMUSCULAR; INTRAVENOUS EVERY 6 HOURS PRN
Status: DISCONTINUED | OUTPATIENT
Start: 2022-06-27 | End: 2022-06-28 | Stop reason: HOSPADM

## 2022-06-27 RX ORDER — MULTIVIT-MIN/IRON/FOLIC ACID/K 18-600-40
25 CAPSULE ORAL DAILY
COMMUNITY

## 2022-06-27 RX ORDER — ONDANSETRON 4 MG/1
4 TABLET, ORALLY DISINTEGRATING ORAL EVERY 6 HOURS PRN
Status: DISCONTINUED | OUTPATIENT
Start: 2022-06-27 | End: 2022-06-28 | Stop reason: HOSPADM

## 2022-06-27 RX ORDER — ACETAMINOPHEN 325 MG/1
650 TABLET ORAL EVERY 6 HOURS PRN
COMMUNITY
End: 2024-02-23

## 2022-06-27 RX ORDER — SODIUM CHLORIDE 9 MG/ML
INJECTION, SOLUTION INTRAVENOUS CONTINUOUS
Status: ACTIVE | OUTPATIENT
Start: 2022-06-27 | End: 2022-06-28

## 2022-06-27 RX ADMIN — SODIUM CHLORIDE: 9 INJECTION, SOLUTION INTRAVENOUS at 17:57

## 2022-06-27 RX ADMIN — PANTOPRAZOLE SODIUM 80 MG: 40 INJECTION, POWDER, FOR SOLUTION INTRAVENOUS at 11:18

## 2022-06-27 RX ADMIN — SODIUM CHLORIDE 8 MG/HR: 9 INJECTION, SOLUTION INTRAVENOUS at 11:53

## 2022-06-27 RX ADMIN — PANTOPRAZOLE SODIUM 40 MG: 40 INJECTION, POWDER, FOR SOLUTION INTRAVENOUS at 20:07

## 2022-06-27 ASSESSMENT — ENCOUNTER SYMPTOMS
ANAL BLEEDING: 1
BLOOD IN STOOL: 1
RECTAL PAIN: 1
NAUSEA: 1

## 2022-06-27 NOTE — PHARMACY-ADMISSION MEDICATION HISTORY
Admission medication history interview status for this patient is complete. See [facility] admission navigator for allergy information, prior to admission medications and immunization status.     Medication history interview source(s):Patient  Medication history resources (including written lists, pill bottles, clinic record):None  Primary pharmacy:-----    Changes made to PTA medication list:  Added: tylenol, ibuprofen, vitamin d, vitamin c  Deleted: pyridium 200mg tid prn, pravastatin 20mg daily  Changed: ----    Actions taken by pharmacist (provider contacted, etc):None     Additional medication history information:None    Medication reconciliation/reorder completed by provider prior to medication history? No      For patients on insulin therapy:N    Prior to Admission medications    Medication Sig Last Dose Taking? Auth Provider Long Term End Date   acetaminophen (TYLENOL) 325 MG tablet Take 650 mg by mouth every 6 hours as needed for mild pain More than a month at Unknown time Yes Unknown, Entered By History     Ascorbic Acid (VITAMIN C) 500 MG CHEW Take 1 tablet by mouth daily 6/27/2022 at am Yes Unknown, Entered By History     ibuprofen (ADVIL/MOTRIN) 200 MG tablet Take 400 mg by mouth every 6 hours as needed for mild pain More than a month at Unknown time Yes Unknown, Entered By History     Vitamin D, Cholecalciferol, 25 MCG (1000 UT) TABS Take 25 mcg by mouth daily 6/27/2022 at am Yes Unknown, Entered By History

## 2022-06-27 NOTE — ED TRIAGE NOTES
Friday started having black stool mixed with red blood. Last night and today states she is dizzy. Discomfort in abdomen when going to the bathroom. Also notes foul smell to stool.

## 2022-06-27 NOTE — PROGRESS NOTES
Care Management Note    Discharge Date: 06/28/2022     Discharge Disposition:  Home    Handoff Referral Completed: Yes    Additional Information:  Pt identified as a Service Bundle #2. No needs or assessment needed at this time. Please consult CM/SW  if discharge needs should arise.    OKSANA Torre

## 2022-06-27 NOTE — PROGRESS NOTES
ROOM # 231    Living Situation home with    Facility name:  : spouse    Activity level at baseline: independent  Activity level on admit: independent    Who will be transporting you at discharge: spouse    Patient registered to observation; given Patient Bill of Rights; given the opportunity to ask questions about observation status and their plan of care.  Patient has been oriented to the observation room, bathroom and call light is in place.    Discussed discharge goals and expectations with patient/family.

## 2022-06-27 NOTE — PROGRESS NOTES
"PRIMARY DIAGNOSIS: GI BLEED    OUTPATIENT/OBSERVATION GOALS TO BE MET BEFORE DISCHARGE  1. Orthostatic performed: N/A    2. Stable Hgb Yes.   Recent Labs   Lab Test 06/27/22  1013 07/13/20  0850 07/17/19  0942   HGB 9.8* 13.9 14.2       3. Resolved or declined bleeding episodes: No,  with a small amount of bleeding Last episode: today    4. Appropriate testing complete: No, will do tomorrow    5. Cleared for discharge by consultants (if involved): No    6. Safe discharge environment identified: No    Discharge Planner Nurse   Safe discharge environment identified: No  Barriers to discharge: Yes       Entered by: Rosy Sewell RN 06/27/2022 4:17 PM    Pt. A&O time 4, independent for mobility, denied pain, CMS intact, on clear liquid diet, and NPO at midnight. Will has colonoscope tomorrow.    /80 (BP Location: Right arm)   Pulse 76   Temp 97.6  F (36.4  C) (Oral)   Resp 18   Ht 1.575 m (5' 2\")   Wt 75.4 kg (166 lb 3.2 oz)   LMP 07/03/2007   SpO2 97%   BMI 30.40 kg/m    Please review provider order for any additional goals.   Nurse to notify provider when observation goals have been met and patient is ready for discharge.  "

## 2022-06-27 NOTE — CONSULTS
[facility]  Gastroenterology Consultation         Niko Ren MD    Patient Name: Saadia Sorensen MRN# [id number]   YOB: 1960 Age: 61 year old      Date of Admission:  6/27/2022  Today's Date: 06/27/2022         Assessment and Plan:     Impression:  -61-year-old female with clinical history of melena and concomitant new anemia consistent with subacute/resolving upper GI bleeding.  Differential diagnosis includes likely nonsteroidal gastropathy/ulceration, H. pylori gastric or duodenal ulcer or cannot rule out esophagitis, esophageal ulceration, small bowel ulceration less likely.  No evidence or history to suggest lower GI bleeding.  No history of liver disease to suggest acute variceal upper GI bleeding.  Bleeding seems to be resolving, hemodynamics are stable.  -Family history of colon cancer with personal history of colonic polyps.  Last colonoscopy in 2019 showed 1 adenoma and she is up-to-date until 2024.  -Stable medical problems under the care of the patient's admitting team and outpatient primary care team.    Recommendations:  -Clear liquid diet.  -Continue pantoprazole 40 mg IV twice daily.  -N.p.o. after midnight for EGD tomorrow early afternoon.  -Hopefully can discharge home after EGD if clean-based ulceration is noted.  -Continue supportive care including monitoring of hemoglobin hematocrit and any needed transfusions per admitting team.  -No aspirin nonsteroidal products long-term in this patient likely.            Primary Care Physician:     Haase, Susan Rachele [phone]            Requesting Physician:        Charmaine Ramos PA-C         Chief Complaint:     Melena         History of Present Illness:     Saadia Sorensen is a 61 year old female who comes to the emergency room with 3 days of melena.  Patient reports new onset black stool starting Friday.  On Friday Saturday and Sunday she had 3 sticky black stools with some nonspecific abdominal bloating.  There is  no abdominal pain per se.  There is no nausea or vomiting.  There is no hematemesis.  Today as of 3 PM she has had only 2 bowel movements.  Hemoglobin is 9.8 down from 13 last checked 2 years ago.  BUN/creatinine ratio is slightly elevated which may represent resolving subacute GI bleeding.  There is no rectal bleeding or melena.  She has never had an EGD.  Her last colonoscopy as noted above was in 2019 for family history of colon cancer and she had a small adenoma.  There is no dysphagia odynophagia.  No heartburn.  No jaundice, fatigue, pruritus, shortness of breath or diaphoresis is noted.  Patient reports taking ibuprofen/Advil 1-2 times a month although it is listed on her medication list.  There is probably more Advil use than she remembers.           Past Medical History:     Past Medical History:   Diagnosis Date     Calculus of kidney     x 2     Gastritis 12/17/2013             Past Surgical History:     Past Surgical History:   Procedure Laterality Date     COLONOSCOPY       ENT SURGERY      ear drum ruptured and had surgery     [facility] NONSPECIFIC PROCEDURE      (L) TM-operation     [facility] NONSPECIFIC PROCEDURE      s/p spAb x 1            Home Medications:     Prior to Admission medications    Medication Sig Last Dose Taking? Auth Provider Long Term End Date   acetaminophen (TYLENOL) 325 MG tablet Take 650 mg by mouth every 6 hours as needed for mild pain More than a month at Unknown time Yes Unknown, Entered By History     Ascorbic Acid (VITAMIN C) 500 MG CHEW Take 1 tablet by mouth daily 6/27/2022 at am Yes Unknown, Entered By History     ibuprofen (ADVIL/MOTRIN) 200 MG tablet Take 400 mg by mouth every 6 hours as needed for mild pain More than a month at Unknown time Yes Unknown, Entered By History     Vitamin D, Cholecalciferol, 25 MCG (1000 UT) TABS Take 25 mcg by mouth daily 6/27/2022 at am Yes Unknown, Entered By History              Current Medications:                    Allergies:     Allergies    Allergen Reactions     Codeine             Social History:     Saadia Sorensen  reports that she has never smoked. She has never used smokeless tobacco. She reports that she does not drink alcohol and does not use drugs.          Family History:     Family History   Problem Relation Age of Onset     Cancer Mother         colon cancer, colostomy bag     Lipids Mother      Colon Cancer Mother      Family History Negative Father      Family History Negative Maternal Grandmother      Family History Negative Maternal Grandfather      Family History Negative Paternal Grandmother      Family History Negative Paternal Grandfather      Family History Negative Brother      Family History Negative Sister            Review of Systems:     Comprehensive GI review of systems is completed and is negative except as above.         Physical Exam:     Vital Signs with Ranges  Temp:  [98.3  F (36.8  C)] 98.3  F (36.8  C)  Pulse:  [72-96] 83  Resp:  [18] 18  BP: (114-137)/(65-88) 117/70  SpO2:  [95 %-100 %] 96 %  I/O's Last 24 hours  No intake/output data recorded.  Constitutional:  Alert and no distress.   HEENT: PERRL, EOMI, sclera nonicteric, conjunctiva pink and moist.  NECK: Supple, nontender. No lymphadenopathy, JVD or bruits noted.  PULMONARY: Clear to auscultation bilaterally.  CARDIOVASCULAR: S1, S2, no S3, no S4, no murmur, regular rate and rhythm  ABDOMEN: Soft, nontender, nondistended, no tympany, no organomegaly, no fullness, guarding or rebound are noted.   NEURO: Alert and oriented to place, time and person. Neurological exam grossly nonfocal, CN II through XII are grossly intact. Detailed neurological exam is not performed.  EXT: No cyanosis, clubbing or edema.  Rectal: Deferred       Data:   All new lab and imaging data was reviewed.  .  Recent Labs   Lab Test 06/27/22  1013 07/13/20  0850 07/17/19  0942   WBC 7.1 7.0 8.3   HGB 9.8* 13.9 14.2   MCV 93 91 89    240 243   INR 1.09  --   --      Recent Labs   Lab Test  06/27/22  1013 10/14/20  0825 07/13/20  0850    137 138   POTASSIUM 3.7 3.8 4.1   CHLORIDE 109 107 106   CO2 27 26 28   BUN 22 13 13   CR 0.66 0.72 0.71   ANIONGAP 5 4 4     Recent Labs   Lab Test 06/27/22  1013 10/14/20  0825 09/22/20  1041 07/13/20  0850 07/09/20  1144 07/17/19  0942 12/11/15  1157   ALBUMIN 3.3* 3.5  --  3.7  --    < >  --    BILITOTAL 0.3 0.4  --  0.4  --    < >  --    ALT 17 22  --  19  --    < >  --    AST 14 16  --  15  --    < >  --    ALKPHOS 62 78  --  76  --    < >  --    PROTEIN  --   --  Negative  --  Negative  --  Negative   LIPASE  --   --   --  74  --   --   --    AMYLASE  --   --   --  45  --   --   --     < > = values in this interval not displayed.            Niko Ren MD, FACG  [facility]  [phone]

## 2022-06-27 NOTE — ED PROVIDER NOTES
History   Chief Complaint:  Rectal Bleeding     HPI   Saadia Sorensen is a 61 year old female who presents with rectal bleeding. The patient reports black stool mixed with red blood for the last 3 days where she going to the bathroom 3-4 times throughout the day. That day she also had nausea but no vomiting. The bleeding continued to over the next few days and was accompanied by a burning and discomfort after bowel movements. She started to have lightheadedness yesterday after work along with some chest tightness and shortness of breath. This morning there has been lightheadedness when she got up in the morning to go to the bathroom. Today, she had called her primary care clinic, who recommend she come to the ED. She reports eating a good diet with no recent changes, and not much greasy food.    Review of Systems   Gastrointestinal: Positive for anal bleeding, blood in stool, nausea and rectal pain.   All other systems reviewed and are negative.        Allergies:  Codeine    Medications:  phenazopyridine  pravastatin    Past Medical History:     Hypercholesteremia  Hyperlipidemia  Gastritis  Nephrolithiasis  Family history of colon cancer  Osteopenia of lumbar spine    Past Surgical History:    ENT surgery     Family History:    Mother: Colon cancer, Lipids,    Social History:  Here with friend Jaskaran.  She arrived to ED by car.    Physical Exam     Patient Vitals for the past 24 hrs:   BP Temp Temp src Pulse Resp SpO2   06/27/22 1400 121/65 -- -- 81 -- 98 %   06/27/22 1345 125/70 -- -- 80 -- 100 %   06/27/22 1330 121/68 -- -- 81 -- 100 %   06/27/22 1315 114/68 -- -- 72 -- 98 %   06/27/22 1300 116/70 -- -- 81 -- 98 %   06/27/22 1130 129/81 -- -- 78 -- 95 %   06/27/22 1115 125/77 -- -- 77 -- 98 %   06/27/22 1011 137/88 98.3  F (36.8  C) Temporal -- 18 --   06/27/22 1010 -- -- -- 96 -- 99 %       Physical Exam  Constitutional: Alert, attentive  HENT:    Nose: Nose normal.    Mouth/Throat: Oropharynx is clear, mucous  membranes are moist   Eyes: EOM are normal.   CV: regular rate and rhythm; no murmurs, rubs or gallups  Chest: Effort normal and breath sounds normal.   GI:  There is no tenderness. No distension. Normal bowel sounds  MSK: Normal range of motion.   Neurological: Alert, attentive  Skin: Skin is warm and dry.      Emergency Department Course   ECG  ECG results from 06/27/22   EKG 12 lead     Value    Systolic Blood Pressure     Diastolic Blood Pressure     Ventricular Rate 78    Atrial Rate 78    CA Interval 142    QRS Duration 80        QTc 446    P Axis 47    R AXIS 46    T Axis 20    Interpretation ECG      Sinus rhythm  Normal ECG  When compared with ECG of 22-JAN-2009 13:16,  No significant change was found       Laboratory:  Labs Ordered and Resulted from Time of ED Arrival to Time of ED Departure   BASIC METABOLIC PANEL - Abnormal       Result Value    Sodium 141      Potassium 3.7      Chloride 109      Carbon Dioxide (CO2) 27      Anion Gap 5      Urea Nitrogen 22      Creatinine 0.66      Calcium 8.7      Glucose 129 (*)     GFR Estimate >90     CBC WITH PLATELETS AND DIFFERENTIAL - Abnormal    WBC Count 7.1      RBC Count 3.35 (*)     Hemoglobin 9.8 (*)     Hematocrit 31.1 (*)     MCV 93      MCH 29.3      MCHC 31.5      RDW 13.2      Platelet Count 170      % Neutrophils 51      % Lymphocytes 41      % Monocytes 4      % Eosinophils 3      % Basophils 0      % Immature Granulocytes 1      NRBCs per 100 WBC 0      Absolute Neutrophils 3.6      Absolute Lymphocytes 3.0      Absolute Monocytes 0.3      Absolute Eosinophils 0.2      Absolute Basophils 0.0      Absolute Immature Granulocytes 0.0      Absolute NRBCs 0.0     HEPATIC FUNCTION PANEL - Abnormal    Bilirubin Total 0.3      Bilirubin Direct <0.1      Protein Total 7.6      Albumin 3.3 (*)     Alkaline Phosphatase 62      AST 14      ALT 17     INR - Normal    INR 1.09     TROPONIN I - Normal    Troponin I High Sensitivity 3     COVID-19 VIRUS  (CORONAVIRUS) BY PCR - Normal    SARS CoV2 PCR Negative     TYPE AND SCREEN, ADULT    ABO/RH(D) A POS      Antibody Screen Negative      SPECIMEN EXPIRATION DATE [id number]     ABO/RH TYPE AND SCREEN      Emergency Department Course:           Reviewed:  I reviewed nursing notes, vitals, past medical history and Care Everywhere    Assessments:  1101 I obtained history and examined the patient as noted above.   1228 I rechecked the patient and explained findings.     Consults:  1235 I consulted Dr. Mahmood of hospital services    Interventions:  Medications   pantoprazole (PROTONIX) 80 mg in sodium chloride 0.9 % 100 mL infusion (8 mg/hr Intravenous New Bag 6/27/22 1153)   pantoprazole (PROTONIX) IV push injection 80 mg (80 mg Intravenous Given 6/27/22 1118)     Disposition:  The patient was admitted to the hospital under the care of Dr. Mahmood.     Impression & Plan     CMS Diagnoses: None    Medical Decision Making:  This is a 61-year-old female who presents for evaluation of black and bloody stools likely consistent with upper GI bleeding.  The patient is hemodynamically stable but work-up shows significant potential drop from roughly a hemoglobin of 14 to 10.  She describes symptoms as well of intermittent chest discomfort in the last 24 hours and lightheadedness also consistent with acute anemia due to blood loss.  She has a benign abdominal exam and no abdominal pain to suggest ischemic gut.  Protonix bolus and drip were initiated, patient is made n.p.o., and she will be placed to the abs unit for serial enzymes, supportive cares, and GI consultation.    Diagnosis:    ICD-10-CM    1. Upper GI bleed  K92.2    2. Anemia due to blood loss, acute  D62        Scribe Disclosure:  I, Shashank Oakley, am serving as a scribe at 10:57 AM on 6/27/2022 to document services personally performed by Dick Shane MD based on my observations and the provider's statements to me.              Dick Shane,  MD  06/27/22 1446

## 2022-06-27 NOTE — ED NOTES
[facility]  ED Nurse Handoff Report    Saadia Sorensen is a 61 year old female   ED Chief complaint: Rectal Bleeding  . ED Diagnosis:   Final diagnoses:   None     Allergies:   Allergies   Allergen Reactions     Codeine        Code Status: Full Code  Activity level - Baseline/Home:  Independent. Activity Level - Current:   Stand by Assist. Lift room needed: No. Bariatric: No   Needed: No   Isolation: No. Infection: Not Applicable.     Vital Signs:   Vitals:    06/27/22 1010 06/27/22 1011 06/27/22 1115 06/27/22 1130   BP:  137/88 125/77 129/81   Pulse: 96  77 78   Resp:  18     Temp:  98.3  F (36.8  C)     TempSrc:  Temporal     SpO2: 99%  98% 95%       Cardiac Rhythm:  ,      Pain level:    Patient confused: No. Patient Falls Risk: No.   Elimination Status: Has voided   Patient Report - Initial Complaint: Rectal bleeding. Focused Assessment:  Saadia Sorensen is a 61 year old female who presents with rectal bleeding. The patient reports black stool mixed with red blood for the last 3 days where she going to the bathroom 3-4 times throughout the day. That day she also had nausea but no vomiting. The bleeding continued to over the next few days and was accompanied by a burning and discomfort after bowel movements. She started to have lightheadedness yesterday after work along with some chest tightness and shortness of breath. This morning there has been lightheadedness when she got up in the morning to go to the bathroom. Today, she had called her primary care clinic, who recommend she come to the ED. She reports eating a good diet with no recent changes, and not much greasy food.     Review of Systems   Gastrointestinal: Positive for anal bleeding, blood in stool, nausea and rectal pain.   All other systems reviewed and are negative.  Tests Performed: EKG, labs . Abnormal Results:   Labs Ordered and Resulted from Time of ED Arrival to Time of ED Departure   BASIC METABOLIC PANEL - Abnormal       Result  Value    Sodium 141      Potassium 3.7      Chloride 109      Carbon Dioxide (CO2) 27      Anion Gap 5      Urea Nitrogen 22      Creatinine 0.66      Calcium 8.7      Glucose 129 (*)     GFR Estimate >90     CBC WITH PLATELETS AND DIFFERENTIAL - Abnormal    WBC Count 7.1      RBC Count 3.35 (*)     Hemoglobin 9.8 (*)     Hematocrit 31.1 (*)     MCV 93      MCH 29.3      MCHC 31.5      RDW 13.2      Platelet Count 170      % Neutrophils 51      % Lymphocytes 41      % Monocytes 4      % Eosinophils 3      % Basophils 0      % Immature Granulocytes 1      NRBCs per 100 WBC 0      Absolute Neutrophils 3.6      Absolute Lymphocytes 3.0      Absolute Monocytes 0.3      Absolute Eosinophils 0.2      Absolute Basophils 0.0      Absolute Immature Granulocytes 0.0      Absolute NRBCs 0.0     HEPATIC FUNCTION PANEL - Abnormal    Bilirubin Total 0.3      Bilirubin Direct <0.1      Protein Total 7.6      Albumin 3.3 (*)     Alkaline Phosphatase 62      AST 14      ALT 17     INR - Normal    INR 1.09     TROPONIN I - Normal    Troponin I High Sensitivity 3     COVID-19 VIRUS (CORONAVIRUS) BY PCR   TYPE AND SCREEN, ADULT    ABO/RH(D) A POS      Antibody Screen Negative      SPECIMEN EXPIRATION DATE [id number]     ABO/RH TYPE AND SCREEN     .   Treatments provided: See MAR   Family Comments: Family at bedside   OBS brochure/video discussed/provided to patient:  No  ED Medications:   Medications   pantoprazole (PROTONIX) 80 mg in sodium chloride 0.9 % 100 mL infusion (8 mg/hr Intravenous New Bag 6/27/22 1153)   pantoprazole (PROTONIX) IV push injection 80 mg (80 mg Intravenous Given 6/27/22 1118)     Drips infusing:  Yes  For the majority of the shift, the patient's behavior Green. Interventions performed were N/A.    Sepsis treatment initiated: No     Patient tested for COVID 19 prior to admission: YES    ED Nurse Name/Phone Number: Jeri Suarez RN,   12:27 PM  RECEIVING UNIT ED HANDOFF REVIEW    Above ED Nurse Handoff  Report was reviewed: Yes  Reviewed by: Rosy Sewell RN on June 27, 2022 at 3:09 PM

## 2022-06-27 NOTE — TELEPHONE ENCOUNTER
Stool black since Friday.  Started to get dizzy yesterday.  Blood in stool. Yesterday and today.    Patient states she has been moderately dizzy, and has had nausea.for a few days along with the blood in stool and blackened stools.    Patient advised to go to there ER today.    Evelia Savage RN   [facility] Nurse Advisor      Reason for Disposition    SEVERE dizziness (e.g., unable to stand, requires support to walk, feels like passing out now)    Additional Information    Negative: Passed out (i.e., fainted, collapsed and was not responding)    Negative: Shock suspected (e.g., cold/pale/clammy skin, too weak to stand, low BP, rapid pulse)    Negative: Vomiting red blood or black (coffee ground) material    Negative: Sounds like a life-threatening emergency to the triager    Negative: Diarrhea is the main symptom    Negative: Rectal symptoms    Negative: SEVERE rectal bleeding (large blood clots; on and off, or constant bleeding)    Protocols used: RECTAL BLEEDING-A-[place]

## 2022-06-27 NOTE — H&P
[facility]  Admission History and Physical Examination    NAME: Saadia Sorensen   : 1960   MRN: [id number]     Date of Admission:  2022    Assessment & Plan   Saadia Sorensen is a 61 year old female with a past medical history significant for GERD, previous history of gastritis, last colonoscopy in 2019 showing 5 mm polyp internal hemorrhoids and and strong family hx of colon ca who presents with 4-day history of melanotic stool with some bright red flecks mixed with stool in the last day or so.  She came to emergency room due to concerns of lightheadedness and dizziness and some mild chest discomfort and shortness of breath.    Upon arrival to the emergency room vital signs are stable.  Laboratory results shows unremarkable BMP within normal BUN and normal LFTs.  Hemoglobin was 9.8 with normal platelet count with normal MCV's.  Last hemoglobin in our system was 13 2 years ago.  She was started on Protonix drip and was recommended for mission to the hospital.    #Melanotic stool.   4 days of dark tarry light school in the setting of new anemia suggestive of likely GI bleeding from an upper source.  Concern for possible peptic ulcer disease, gastritis, H. Pylori. Denies consistent NSAID use (once a month)  No signs of active bleeding at this time  -Appreciate GI consult, plan for clear liquids for now, n.p.o. after midnight  -Continue Protonix twice daily  -Repeat hemoglobin at 8:00 tonight, transfuse only if less than 7  -Gentle IV fluids for now  -We will check iron levels  - Pt concern if she needs repeat c-scope sooner given family hx of colon ca, will defer to GI    Awaiting formal pharmacy reconciliation to resume home medications.     DVT Prophylaxis: Low Risk/Ambulatory with no VTE prophylaxis indicated  Code Status: Full Code  COVID PCR TESTING STATUS: Negative        Expected Discharge Date: 2022               Charmaine Ramos PA-C    Primary Care Physician   Deepali  Haase    Chief Complaint   Dark stool     History is obtained from the patient    Discussed with Dr. Helton  in the ED, full chart review including lab work, imaging, and vital signs were reviewed.     History of Present Illness   Saadia Sorensen is a 61 year old female with a past medical history significant for GERD, previous history of gastritis, last colonoscopy in September 2019 showing 5 mm polyp internal hemorrhoids and and strong family hx of colon ca who presents with 4-day history of melanotic stool with some bright red flecks mixed with stool in the last day or so.  She came to emergency room due to concerns of lightheadedness and dizziness and some mild chest discomfort and shortness of breath.    Upon arrival to the emergency room vital signs are stable.  Laboratory results shows unremarkable BMP within normal BUN and normal LFTs.  Hemoglobin was 9.8 with normal platelet count with normal MCV's.  Last hemoglobin in our system was 13 2 years ago.  She was started on Protonix drip and was recommended for mission to the hospital.    Past Medical History    I have reviewed this patient's medical history and updated it with pertinent information if needed.   Past Medical History:   Diagnosis Date     Calculus of kidney     x 2     Gastritis 12/17/2013       Past Surgical History   I have reviewed this patient's surgical history and updated it with pertinent information if needed.  Past Surgical History:   Procedure Laterality Date     COLONOSCOPY       ENT SURGERY      ear drum ruptured and had surgery     [facility] NONSPECIFIC PROCEDURE      (L) TM-operation     [facility] NONSPECIFIC PROCEDURE      s/p spAb x 1       Prior to Admission Medications   Prior to Admission Medications   Prescriptions Last Dose Informant Patient Reported? Taking?   Ascorbic Acid (VITAMIN C) 500 MG CHEW 6/27/2022 at am  Yes Yes   Sig: Take 1 tablet by mouth daily   Vitamin D, Cholecalciferol, 25 MCG (1000 UT) TABS 6/27/2022 at am  Yes Yes   Sig:  Take 25 mcg by mouth daily   acetaminophen (TYLENOL) 325 MG tablet More than a month at Unknown time  Yes Yes   Sig: Take 650 mg by mouth every 6 hours as needed for mild pain   ibuprofen (ADVIL/MOTRIN) 200 MG tablet More than a month at Unknown time  Yes Yes   Sig: Take 400 mg by mouth every 6 hours as needed for mild pain      Facility-Administered Medications: None     Allergies   Allergies   Allergen Reactions     Codeine        Social History   I have reviewed this patient's social history and updated it with pertinent information if needed. Saadia Sorensen  reports that she has never smoked. She has never used smokeless tobacco. She reports that she does not drink alcohol and does not use drugs.    Family History   I have reviewed this patient's family history and updated it with pertinent information if needed.   Family History   Problem Relation Age of Onset     Cancer Mother         colon cancer, colostomy bag     Lipids Mother      Colon Cancer Mother      Family History Negative Father      Family History Negative Maternal Grandmother      Family History Negative Maternal Grandfather      Family History Negative Paternal Grandmother      Family History Negative Paternal Grandfather      Family History Negative Brother      Family History Negative Sister        Review of Systems   The 10 point Review of Systems is negative other than noted in the HPI or here.     Physical Exam   Temp: 97.6  F (36.4  C) Temp src: Oral BP: 127/80 Pulse: 76   Resp: 18 SpO2: 97 % O2 Device: None (Room air)    Vital Signs with Ranges  Temp:  [97.6  F (36.4  C)-98.3  F (36.8  C)] 97.6  F (36.4  C)  Pulse:  [72-96] 76  Resp:  [18] 18  BP: (114-137)/(65-88) 127/80  SpO2:  [95 %-100 %] 97 %  166 lbs 3.2 oz    Constitutional: Awake, alert,  no apparent distress.  Eyes: Conjunctiva and pupils examined and normal.  HEENT: Moist mucous membranes, normal dentition.  Respiratory: Clear to auscultation bilaterally, no crackles or  wheezing.  Cardiovascular: Regular rate and rhythm, normal S1 and S2, and no murmur noted.  GI: Soft, non-distended, non-tender, bowel sounds present. No rebound tenderness or guarding.  Lymph/Hematologic: No anterior cervical or supraclavicular adenopathy.  Skin: No rashes, no cyanosis, no edema.  Musculoskeletal: No deformities noted.  No erythema or tenderness. Moving all extremities.  Neurologic: No focal deficits noted. Speech is clear. Coordination and strength grossly normal.   Psychiatric: Appropriate affect.    Data   Data reviewed today:      Imaging: No results found for this or any previous visit (from the past 24 hour(s)).    Recent Labs   Lab 06/27/22  1013   WBC 7.1   HGB 9.8*   MCV 93      INR 1.09      POTASSIUM 3.7   CHLORIDE 109   CO2 27   BUN 22   CR 0.66   ANIONGAP 5   RENE 8.7   *   ALBUMIN 3.3*   PROTTOTAL 7.6   BILITOTAL 0.3   ALKPHOS 62   ALT 17   AST 14           Charmaine Ramos PA-C  [facility] Medicine  June 27, 2022  Securely message with the Vocera Web Console (learn more here)  Text page via [facility] Paging/Directory

## 2022-06-28 VITALS
TEMPERATURE: 97.7 F | SYSTOLIC BLOOD PRESSURE: 133 MMHG | WEIGHT: 166.2 LBS | HEART RATE: 78 BPM | DIASTOLIC BLOOD PRESSURE: 80 MMHG | OXYGEN SATURATION: 97 % | RESPIRATION RATE: 18 BRPM | HEIGHT: 62 IN | BODY MASS INDEX: 30.59 KG/M2

## 2022-06-28 LAB
ATRIAL RATE - MUSE: 78 BPM
DIASTOLIC BLOOD PRESSURE - MUSE: NORMAL MMHG
ERYTHROCYTE [DISTWIDTH] IN BLOOD BY AUTOMATED COUNT: 13.4 % (ref 10–15)
HCT VFR BLD AUTO: 29.5 % (ref 35–47)
HGB BLD-MCNC: 9.3 G/DL (ref 11.7–15.7)
INTERPRETATION ECG - MUSE: NORMAL
MCH RBC QN AUTO: 29.7 PG (ref 26.5–33)
MCHC RBC AUTO-ENTMCNC: 31.5 G/DL (ref 31.5–36.5)
MCV RBC AUTO: 94 FL (ref 78–100)
P AXIS - MUSE: 47 DEGREES
PLATELET # BLD AUTO: 179 10E3/UL (ref 150–450)
PR INTERVAL - MUSE: 142 MS
QRS DURATION - MUSE: 80 MS
QT - MUSE: 392 MS
QTC - MUSE: 446 MS
R AXIS - MUSE: 46 DEGREES
RBC # BLD AUTO: 3.13 10E6/UL (ref 3.8–5.2)
SYSTOLIC BLOOD PRESSURE - MUSE: NORMAL MMHG
T AXIS - MUSE: 20 DEGREES
UPPER GI ENDOSCOPY: NORMAL
VENTRICULAR RATE- MUSE: 78 BPM
WBC # BLD AUTO: 5.8 10E3/UL (ref 4–11)

## 2022-06-28 PROCEDURE — 250N000009 HC RX 250: Performed by: INTERNAL MEDICINE

## 2022-06-28 PROCEDURE — 85014 HEMATOCRIT: CPT | Performed by: PHYSICIAN ASSISTANT

## 2022-06-28 PROCEDURE — C9113 INJ PANTOPRAZOLE SODIUM, VIA: HCPCS | Performed by: PHYSICIAN ASSISTANT

## 2022-06-28 PROCEDURE — 96361 HYDRATE IV INFUSION ADD-ON: CPT | Mod: XU

## 2022-06-28 PROCEDURE — 43239 EGD BIOPSY SINGLE/MULTIPLE: CPT | Performed by: INTERNAL MEDICINE

## 2022-06-28 PROCEDURE — 99217 PR OBSERVATION CARE DISCHARGE: CPT | Performed by: PHYSICIAN ASSISTANT

## 2022-06-28 PROCEDURE — 250N000011 HC RX IP 250 OP 636: Performed by: PHYSICIAN ASSISTANT

## 2022-06-28 PROCEDURE — G0378 HOSPITAL OBSERVATION PER HR: HCPCS

## 2022-06-28 PROCEDURE — 96376 TX/PRO/DX INJ SAME DRUG ADON: CPT

## 2022-06-28 PROCEDURE — 36415 COLL VENOUS BLD VENIPUNCTURE: CPT | Performed by: PHYSICIAN ASSISTANT

## 2022-06-28 PROCEDURE — 999N000099 HC STATISTIC MODERATE SEDATION < 10 MIN: Performed by: INTERNAL MEDICINE

## 2022-06-28 PROCEDURE — 88305 TISSUE EXAM BY PATHOLOGIST: CPT | Mod: TC | Performed by: INTERNAL MEDICINE

## 2022-06-28 PROCEDURE — 250N000011 HC RX IP 250 OP 636: Performed by: INTERNAL MEDICINE

## 2022-06-28 RX ORDER — LIDOCAINE 40 MG/G
CREAM TOPICAL
Status: DISCONTINUED | OUTPATIENT
Start: 2022-06-28 | End: 2022-06-28 | Stop reason: HOSPADM

## 2022-06-28 RX ORDER — NALOXONE HYDROCHLORIDE 0.4 MG/ML
0.2 INJECTION, SOLUTION INTRAMUSCULAR; INTRAVENOUS; SUBCUTANEOUS
Status: DISCONTINUED | OUTPATIENT
Start: 2022-06-28 | End: 2022-06-28 | Stop reason: HOSPADM

## 2022-06-28 RX ORDER — DIPHENHYDRAMINE HYDROCHLORIDE 50 MG/ML
25-50 INJECTION INTRAMUSCULAR; INTRAVENOUS
Status: DISCONTINUED | OUTPATIENT
Start: 2022-06-28 | End: 2022-06-28 | Stop reason: HOSPADM

## 2022-06-28 RX ORDER — FENTANYL CITRATE 0.05 MG/ML
50-100 INJECTION, SOLUTION INTRAMUSCULAR; INTRAVENOUS EVERY 5 MIN PRN
Status: DISCONTINUED | OUTPATIENT
Start: 2022-06-28 | End: 2022-06-28 | Stop reason: HOSPADM

## 2022-06-28 RX ORDER — SIMETHICONE 40MG/0.6ML
133 SUSPENSION, DROPS(FINAL DOSAGE FORM)(ML) ORAL
Status: DISCONTINUED | OUTPATIENT
Start: 2022-06-28 | End: 2022-06-28 | Stop reason: HOSPADM

## 2022-06-28 RX ORDER — NALOXONE HYDROCHLORIDE 0.4 MG/ML
0.4 INJECTION, SOLUTION INTRAMUSCULAR; INTRAVENOUS; SUBCUTANEOUS
Status: DISCONTINUED | OUTPATIENT
Start: 2022-06-28 | End: 2022-06-28 | Stop reason: HOSPADM

## 2022-06-28 RX ORDER — PANTOPRAZOLE SODIUM 40 MG/1
40 TABLET, DELAYED RELEASE ORAL
Status: DISCONTINUED | OUTPATIENT
Start: 2022-06-29 | End: 2022-06-28 | Stop reason: HOSPADM

## 2022-06-28 RX ORDER — ATROPINE SULFATE 0.1 MG/ML
1 INJECTION INTRAVENOUS
Status: DISCONTINUED | OUTPATIENT
Start: 2022-06-28 | End: 2022-06-28 | Stop reason: HOSPADM

## 2022-06-28 RX ORDER — EPINEPHRINE 1 MG/ML
0.1 INJECTION, SOLUTION INTRAMUSCULAR; SUBCUTANEOUS
Status: DISCONTINUED | OUTPATIENT
Start: 2022-06-28 | End: 2022-06-28 | Stop reason: HOSPADM

## 2022-06-28 RX ORDER — FLUMAZENIL 0.1 MG/ML
0.2 INJECTION, SOLUTION INTRAVENOUS
Status: DISCONTINUED | OUTPATIENT
Start: 2022-06-28 | End: 2022-06-28 | Stop reason: HOSPADM

## 2022-06-28 RX ORDER — PANTOPRAZOLE SODIUM 40 MG/1
40 TABLET, DELAYED RELEASE ORAL
Qty: 60 TABLET | Refills: 0 | Status: SHIPPED | OUTPATIENT
Start: 2022-06-29 | End: 2022-07-07 | Stop reason: ALTCHOICE

## 2022-06-28 RX ADMIN — TOPICAL ANESTHETIC 0.5 ML: 200 SPRAY DENTAL; PERIODONTAL at 13:19

## 2022-06-28 RX ADMIN — PANTOPRAZOLE SODIUM 40 MG: 40 INJECTION, POWDER, FOR SOLUTION INTRAVENOUS at 08:54

## 2022-06-28 RX ADMIN — MIDAZOLAM HYDROCHLORIDE 2 MG: 1 INJECTION, SOLUTION INTRAMUSCULAR; INTRAVENOUS at 13:20

## 2022-06-28 RX ADMIN — FENTANYL CITRATE 100 MCG: 50 INJECTION INTRAMUSCULAR; INTRAVENOUS at 13:20

## 2022-06-28 NOTE — PLAN OF CARE
"PRIMARY DIAGNOSIS: GI BLEED    OUTPATIENT/OBSERVATION GOALS TO BE MET BEFORE DISCHARGE  Orthostatic performed: N/A    Stable Hgb Yes.   Recent Labs   Lab Test 06/27/22 2002 06/27/22  1013 07/13/20  0850   HGB 9.2* 9.8* 13.9       Resolved or declined bleeding episodes: Yes Last episode: 6/27 AM    Appropriate testing complete: No    Cleared for discharge by consultants (if involved): No      Discharge Planner Nurse   Safe discharge environment identified: Yes  Barriers to discharge: Yes       Entered by: Pavel Broderick RN 06/27/2022 10:18 PM  /82 (BP Location: Left arm)   Pulse 79   Temp 97.8  F (36.6  C) (Oral)   Resp 16   Ht 1.575 m (5' 2\")   Wt 75.4 kg (166 lb 3.2 oz)   LMP 07/03/2007   SpO2 97%   BMI 30.40 kg/m    VSS on RA. AxOx4 and able to make needs known. Ambulating independently. Tolerating clear liquid diet. NS infusing @ 75mL/hr. Denies pain. POC reviewed w/ pt and . Plan to continue IVF and protonix as ordered (see MAR), monitor and treat Hgb and pain, until GI consult in the morning. Pt is agreeable to POC and resting peacefully, will continue to provide supportive cares.  Please review provider order for any additional goals.   Nurse to notify provider when observation goals have been met and patient is ready for discharge.  "

## 2022-06-28 NOTE — PROGRESS NOTES
"PRIMARY DIAGNOSIS: GI BLEED    OUTPATIENT/OBSERVATION GOALS TO BE MET BEFORE DISCHARGE  Orthostatic performed: Yes:                        1.             2. Stable Hgb No.   Recent Labs   Lab Test 06/28/22  0558 06/27/22 2002 06/27/22  1013   HGB 9.3* 9.2* 9.8*       3. Resolved or declined bleeding episodes: Yes Last episode: yesterday    4. Appropriate testing complete: No, will do today    5. Cleared for discharge by consultants (if involved): No    6. Safe discharge environment identified: Yes    Discharge Planner Nurse   Safe discharge environment identified: Yes  Barriers to discharge: Yes       Entered by: Rosy Sewell RN 06/28/2022 08:24am   Pt. A&O,independent for mobility, denied pain, will has endoscopy at 13:00pm, on NPO.  /71 (BP Location: Left arm)   Pulse 78   Temp 97.7  F (36.5  C) (Oral)   Resp 16   Ht 1.575 m (5' 2\")   Wt 75.4 kg (166 lb 3.2 oz)   LMP 07/03/2007   SpO2 96%   BMI 30.40 kg/m       Please review provider order for any additional goals.   Nurse to notify provider when observation goals have been met and patient is ready for discharge.  "

## 2022-06-28 NOTE — PLAN OF CARE
"  To Do:  Admitting Diagnosis: UGIB w/ Anemia   Pertinent History: GERD, Gastritis, Colon polyp  Living Situation: Home w/  Toni  Pain plan: Denies; nothing ordered  Mobility: independent  Baseline activity: Ind  Alarms/Safety: Safety  LDA's: Peripheral  Pertinent test results: Iron Binding Cap 231, Hgb 13.9 - 9.8 - 9.2 - 9.3  Consults: GI   Abnormals/Pending: CBC  Other Cares/Comments: BID IV Protonix, IVF, GI   Discharge Disposition: Home with Homecare  Discharge Time: Possibly 6/28    Acuity 1/4    /88   Pulse 77   Temp 97.6  F (36.4  C) (Temporal)   Resp 14   Ht 1.575 m (5' 2\")   Wt 75.4 kg (166 lb 3.2 oz)   LMP 07/03/2007   SpO2 92%   BMI 30.40 kg/m     "

## 2022-06-28 NOTE — PLAN OF CARE
"PRIMARY DIAGNOSIS: GI BLEED    OUTPATIENT/OBSERVATION GOALS TO BE MET BEFORE DISCHARGE  1. Orthostatic performed: N/A    2. Stable Hgb Yes.   Recent Labs   Lab Test 06/27/22 2002 06/27/22  1013 07/13/20  0850   HGB 9.2* 9.8* 13.9       3. Resolved or declined bleeding episodes: Yes Last episode: 6/27 AM    4. Appropriate testing complete: No    5. Cleared for discharge by consultants (if involved): No    Discharge Planner Nurse   Safe discharge environment identified: Yes  Barriers to discharge: Yes       Entered by: Pavel Broderick RN 06/28/2022 2:02 AM  /70 (BP Location: Left arm)   Pulse 84   Temp 97.4  F (36.3  C) (Oral)   Resp 16   Ht 1.575 m (5' 2\")   Wt 75.4 kg (166 lb 3.2 oz)   LMP 07/03/2007   SpO2 96%   BMI 30.40 kg/m    VSS on RA. AxOx4 and able to make needs known. Ambulating independently. Tolerating clear liquid diet, now NPO. PIV SL. Denies pain. Plan to continue IVF and protonix as ordered (see MAR), monitor and treat Hgb and pain, until EGD this afternoon. Pt is agreeable to POC and resting peacefully, will continue to provide supportive cares.  Please review provider order for any additional goals.   Nurse to notify provider when observation goals have been met and patient is ready for discharge.  "

## 2022-06-28 NOTE — DISCHARGE INSTRUCTIONS
Understanding H. pylori and Ulcers  Ulcers are sores in the lining of your digestive tract. They were once thought to be caused by too much spicy food, stress, or an anxious personality. It's now known that most ulcers are likely due to infection with bacteria known as Helicobacter pylori (H. pylori). They could also be from using anti-inflammatory medicines such as aspirin and NSAIDs. These include ibuprofen and naproxen.      An ulcer can form in two areas of the digestive tract; the stomach and the duodenum (where the stomach meets the small intestine).   Common ulcer symptoms  Symptoms include:  Burning, cramping, or hunger-like pain in the stomach area, often 1 to 3 hours after a meal or in the middle of the night  Pain that gets better or worse with eating  Nausea or vomiting  Black, tarry, or bloody stools (which means the ulcer is bleeding)  Or you may have no symptoms.  Your evaluation  An evaluation by your healthcare provider can show if you have an ulcer and determine whether it was caused by H. pylori. Your healthcare provider may ask you questions, examine you, and possibly do some tests. These may include:   A special X-ray called an upper gastrointestinal series, to help locate an ulcer. Before the test, you drink a chalky liquid, called barium. This liquid helps the ulcer show up on the X-ray.  An endoscopic exam, done with a long tube with a camera on the end. The tube is passed through your mouth into your stomach, and allows the healthcare provider to get a closer look at your ulcer. You will be lightly sedated for this procedure. Your healthcare provider can also take a tissue sample to test for H. pylori.  Blood, stool, and breath tests are also available to show whether you have H. pylori in your digestive tract.  Your treatment  To kill H. pylori so your ulcer can heal, your healthcare provider will probably prescribe antibiotics. Other ulcer medicines that help reduce stomach acid may also  be prescribed as well. Testing after treatment may be recommended to be sure the H. pylori infection is gone. Usually, killing H. pylori helps keep the ulcer from returning. However, you can develop ulcers more than once in your lifetime.    Anders last reviewed this educational content on 6/1/2019 2000-2021 [employer]. All rights reserved. This information is not intended as a substitute for professional medical care. Always follow your healthcare professional's instructions.

## 2022-06-28 NOTE — PROGRESS NOTES
[facility]  Pre-Endoscopy History and Physical     Saadia Sorensen MRN# [id number]   YOB: 1960 Age: 61 year old   Today's Date: 06/28/2022    Date of Procedure: 6/27/2022  Primary care provider: Haase, Susan Rachele  Type of Endoscopy: esophagogastroduodenoscopy (upper GI endoscopy)  Reason for Procedure: melena  Type of Anesthesia Anticipated: Moderate (conscious) sedation    HPI:    Saadia is a 61 year old female who will be undergoing the above procedure.      A history and physical has been performed. The patient's medications and allergies have been reviewed. The risks and benefits of the procedure and the sedation options and risks were discussed with the patient.  All questions were answered and informed consent was obtained.      Allergies   Allergen Reactions     Codeine         Current Facility-Administered Medications   Medication     lidocaine (LMX4) kit     lidocaine 1 % 0.1-1 mL     melatonin tablet 1 mg     ondansetron (ZOFRAN ODT) ODT tab 4 mg    Or     ondansetron (ZOFRAN) injection 4 mg     pantoprazole (PROTONIX) IV push injection 40 mg     sodium chloride (PF) 0.9% PF flush 3 mL     sodium chloride (PF) 0.9% PF flush 3 mL       Patient Active Problem List   Diagnosis     Hypercholesteremia     Hyperlipidemia LDL goal <130     Gastritis     History of nephrolithiasis     Family history of colon cancer     Osteopenia of lumbar spine     Upper GI bleed     Anemia due to blood loss, acute        Past Medical History:   Diagnosis Date     Calculus of kidney     x 2     Gastritis 12/17/2013        Past Surgical History:   Procedure Laterality Date     COLONOSCOPY       ENT SURGERY      ear drum ruptured and had surgery     [facility] NONSPECIFIC PROCEDURE      (L) TM-operation     [facility] NONSPECIFIC PROCEDURE      s/p spAb x 1       Social History     Tobacco Use     Smoking status: Never Smoker     Smokeless tobacco: Never Used   Substance Use Topics     Alcohol use: No       Family History  "  Problem Relation Age of Onset     Cancer Mother         colon cancer, colostomy bag     Lipids Mother      Colon Cancer Mother      Family History Negative Father      Family History Negative Maternal Grandmother      Family History Negative Maternal Grandfather      Family History Negative Paternal Grandmother      Family History Negative Paternal Grandfather      Family History Negative Brother      Family History Negative Sister          PHYSICAL EXAM:   /72   Pulse 78   Temp 97.6  F (36.4  C) (Temporal)   Resp 18   Ht 1.575 m (5' 2\")   Wt 75.4 kg (166 lb 3.2 oz)   LMP 07/03/2007   SpO2 100%   BMI 30.40 kg/m   Estimated body mass index is 30.4 kg/m  as calculated from the following:    Height as of this encounter: 1.575 m (5' 2\").    Weight as of this encounter: 75.4 kg (166 lb 3.2 oz).   RESP: lungs clear to auscultation - no rales, rhonchi or wheezes  CV: regular rates and rhythm    IMPRESSION   ASA Class 2 - Mild systemic disease  Mallampati Score: 2      Niko Ren MD                "

## 2022-06-28 NOTE — DISCHARGE SUMMARY
[facility]  Discharge Summary        Saadia Sorensen MRN# [id number]   YOB: 1960 Age: 61 year old     Date of Admission:  6/27/2022  Date of Discharge:  6/28/2022  4:31 PM  Admitting Physician:  Richard Mahmood MD  Discharge Physician: Charmaine Ramos PA-C  Discharging Service: Hospitalist     Primary Provider: Haase, Susan Rachele  Primary Care Physician Phone Number: [phone]         Discharge Diagnoses/Problem Oriented Hospital Course (Providers):    Saadia Sorensen was admitted on 6/27/2022 by Richard Mahmood MD and I would refer you to their history and physical.  The following problems were addressed during her hospitalization:    1. Acute GI bleed   2. Melanotic stool due to duodenal ulcer  3. H.Pylori positive likely causing #1          Code Status:      Full Code        Brief Hospital Stay Summary Sent Home With Patient in AVS:        Reason for your hospital stay      You were admitted for concerns of melanotic and dark stools.  Hemoglobin   has drifted from previous but has been stable at 9.5.  You underwent   endoscopy that did not reveal presence of duodenal ulcer.  You will need   to be on Protonix for the next 2 months on a daily basis.  You are to   absolutely stay away from NSAIDs which include ibuprofen Aleve Motrin   indefinitely.  We also tested your tissue sample for H. pylori which is an   infection that sometimes can cause duodenal ulcer.  Follow-up with your   PCP with those results.  Otherwise you are stable for discharge.         Saadia Sorensen is a 61 year old female with a past medical history significant for GERD, previous history of gastritis, last colonoscopy in September 2019 showing 5 mm polyp internal hemorrhoids and and strong family hx of colon ca who presents with 4-day history of melanotic stool with some bright red flecks mixed with stool in the last day or so.  She came to emergency room due to concerns of lightheadedness and dizziness and some mild chest  discomfort and shortness of breath.     Upon arrival to the emergency room vital signs are stable.  Laboratory results shows unremarkable BMP within normal BUN and normal LFTs.  Hemoglobin was 9.8 with normal platelet count with normal MCV's.  Last hemoglobin in our system was 13 2 years ago.  She was started on Protonix drip and was recommended for mission to the hospital.     #Melanotic stool.   4 days of dark tarry light school in the setting of new anemia suggestive of likely GI bleeding from an upper source.  Concern for possible peptic ulcer disease, gastritis, H. Pylori. Denies consistent NSAID use (once a month)  No signs of active bleeding at this time. She was seen by GI and underwent EGD that revealed duodenal ulcer. Her path ultimately came back positive for H.Pylori and was started on triple abx and PPI             Important Results:      No results for input(s): WBC, HGB, HCT, MCV, PLT in the last 168 hours.  No results for input(s): NA, POTASSIUM, CHLORIDE, CO2, ANIONGAP, GLC, BUN, CR, GFRESTIMATED, GFRESTBLACK, RENE in the last 168 hours.  No results for input(s): CULT in the last 168 hours.           Pending Results:        Unresulted Labs Ordered in the Past 30 Days of this Admission     Date and Time Order Name Status Description    6/28/2022  1:25 PM Surgical Pathology Exam In process             Discharge Instructions and Follow-Up:      Follow-up Appointments     Follow-up and recommended labs and tests       Follow up with primary care provider, Susan Haase, within 7 days for   hospital follow- up.  The following labs/tests are recommended: hgb in 1   week.               Discharge Disposition:      Discharged to home         Discharge Medications:        Current Discharge Medication List      START taking these medications    Details   pantoprazole (PROTONIX) 40 MG EC tablet Take 1 tablet (40 mg) by mouth every morning (before breakfast) for 60 days  Qty: 60 tablet, Refills: 0    Associated  "Diagnoses: Upper GI bleed         CONTINUE these medications which have NOT CHANGED    Details   acetaminophen (TYLENOL) 325 MG tablet Take 650 mg by mouth every 6 hours as needed for mild pain      Ascorbic Acid (VITAMIN C) 500 MG CHEW Take 1 tablet by mouth daily      Vitamin D, Cholecalciferol, 25 MCG (1000 UT) TABS Take 25 mcg by mouth daily         STOP taking these medications       ibuprofen (ADVIL/MOTRIN) 200 MG tablet Comments:   Reason for Stopping:                 Allergies:         Allergies   Allergen Reactions     Codeine            Consultations This Hospital Stay:      Consultation during this admission received from gastroenterology         Condition and Physical on Discharge:      Discharge condition: Stable   Vitals: Blood pressure 133/88, pulse 77, temperature 97.6  F (36.4  C), temperature source Temporal, resp. rate 14, height 1.575 m (5' 2\"), weight 75.4 kg (166 lb 3.2 oz), last menstrual period 07/03/2007, SpO2 92 %, not currently breastfeeding.  166 lbs 3.2 oz      GENERAL:  Comfortable.  PSYCH: pleasant, oriented, No acute distress.  HEENT:  PERRLA. Normal conjunctiva, normal hearing, nasal mucosa and Oropharynx are normal.  NECK:  Supple, no neck vein distention, adenopathy or bruits, normal thyroid.  HEART:  Normal S1, S2 with no murmur, no pericardial rub, gallops or S3 or S4.  LUNGS:  Clear to auscultation, normal Respiratory effort. No wheezing, rales or ronchi.  ABDOMEN:  Soft, no hepatosplenomegaly, normal bowel sounds. Non-tender, non distended.   EXTREMITIES:  No pedal edema, +2 pulses bilateral and equal.  SKIN:  Dry to touch, No rash, wound or ulcerations.  NEUROLOGIC:  CN 2-12 intact, BL 5/5 symmetric upper and lower extremity strength, sensation is intact with no focal deficits.         Discharge Time:      <30 mins         Image Results From This Hospital Stay (For Non-EPIC Providers):        Results for orders placed or performed during the hospital encounter of 07/21/21   MA " Screen Bilateral w/Robinson    Narrative    BILATERAL FULL FIELD DIGITAL SCREENING MAMMOGRAM WITH TOMOSYNTHESIS    Performed on: 7/21/21    Compared to: 07/10/2020 MA Screen Bilateral w/Robinson, 06/12/2019 MA Screen   Bilateral w/Robinson, 11/27/2017 MA Screen Bilateral w/Robinson, and 11/10/2016 MA   Screen Bilateral w/Robinson    Findings: The breasts are heterogeneously dense, which may obscure small   masses. This study was evaluated with the assistance of Computer-Aided   Detection.  Breast Tomosynthesis was used in interpretation. There is no   radiographic evidence of malignancy.     IMPRESSION: ACR BI-RADS Category 1: Negative    RECOMMENDED FOLLOW-UP: Annual routine screening mammogram    The results and recommendations of this examination will be communicated   to the patient.

## 2022-06-28 NOTE — DISCHARGE SUMMARY
Patient alert and oriented  Peripheral IV removed  VSS, on room air  Denies pain  Tolerating clears  Up independently  Discharge instructions discussed with patient, all questions answered  Educated on follow up needs  Discharge medication filled by [facility], given to patient  Left floor with all belongings  Spouse transporting home

## 2022-06-28 NOTE — PLAN OF CARE
"PRIMARY DIAGNOSIS: GI BLEED    OUTPATIENT/OBSERVATION GOALS TO BE MET BEFORE DISCHARGE  Orthostatic performed: N/A    Stable Hgb Yes.   Recent Labs   Lab Test 06/27/22 2002 06/27/22  1013 07/13/20  0850   HGB 9.2* 9.8* 13.9       Resolved or declined bleeding episodes: Yes Last episode: 6/27 AM    Appropriate testing complete: No    Cleared for discharge by consultants (if involved): No      Discharge Planner Nurse   Safe discharge environment identified: Yes  Barriers to discharge: Yes       Entered by: Pavel Broderick RN 06/28/2022 12:30 AM  /61 (BP Location: Left arm)   Pulse 77   Temp 97.7  F (36.5  C) (Oral)   Resp 16   Ht 1.575 m (5' 2\")   Wt 75.4 kg (166 lb 3.2 oz)   LMP 07/03/2007   SpO2 95%   BMI 30.40 kg/m    VSS on RA. AxOx4 and able to make needs known. Ambulating independently. Tolerating clear liquid diet. NS infusing @ 75mL/hr. Denies pain. Hgb recheck reviewed w/ pt. Plan to continue IVF and protonix as ordered (see MAR), monitor and treat Hgb and pain, until GI consult in the morning. Pt is agreeable to POC and resting peacefully, will continue to provide supportive cares.  Please review provider order for any additional goals.   Nurse to notify provider when observation goals have been met and patient is ready for discharge.  "

## 2022-06-29 LAB
PATH REPORT.COMMENTS IMP SPEC: NORMAL
PATH REPORT.COMMENTS IMP SPEC: NORMAL
PATH REPORT.FINAL DX SPEC: NORMAL
PATH REPORT.GROSS SPEC: NORMAL
PATH REPORT.MICROSCOPIC SPEC OTHER STN: NORMAL
PATH REPORT.RELEVANT HX SPEC: NORMAL
PHOTO IMAGE: NORMAL

## 2022-06-29 PROCEDURE — 88305 TISSUE EXAM BY PATHOLOGIST: CPT | Mod: 26 | Performed by: PATHOLOGY

## 2022-07-01 NOTE — RESULT ENCOUNTER NOTE
Called Saadia and informed her of results of +HPylori infection.   Rx called [facility] [place].   Clarithromycin 500 mg p.o. twice daily for 10 days, amoxicillin 1 g p.o. twice daily for 10-day along with omeprazole 40 mg twice daily for 10 days.  Note that she was discharged with pantoprazole however she was complaining of increasing nausea and dizziness with this medication.  I told her to discontinue pantoprazole and give omeprazole to try.  As to the duration of the PPI, she wonders if she had to take it for a full 60 days as previously recommended but I asked her to follow-up with her primary care provider to see if she still needs to take the PPI after her treatment with triple therapy for the H. pylori.

## 2022-07-07 ENCOUNTER — OFFICE VISIT (OUTPATIENT)
Dept: FAMILY MEDICINE | Facility: CLINIC | Age: 62
End: 2022-07-07
Payer: COMMERCIAL

## 2022-07-07 VITALS
OXYGEN SATURATION: 97 % | WEIGHT: 167 LBS | SYSTOLIC BLOOD PRESSURE: 129 MMHG | BODY MASS INDEX: 31.53 KG/M2 | HEIGHT: 61 IN | TEMPERATURE: 98.3 F | DIASTOLIC BLOOD PRESSURE: 82 MMHG | HEART RATE: 85 BPM | RESPIRATION RATE: 16 BRPM

## 2022-07-07 DIAGNOSIS — K29.01 OTHER ACUTE GASTRITIS WITH HEMORRHAGE: Primary | ICD-10-CM

## 2022-07-07 DIAGNOSIS — D64.9 LOW HEMOGLOBIN: ICD-10-CM

## 2022-07-07 DIAGNOSIS — B96.81 GASTRIC ULCER DUE TO HELICOBACTER PYLORI, ACUTE: ICD-10-CM

## 2022-07-07 DIAGNOSIS — Z11.59 NEED FOR HEPATITIS C SCREENING TEST: ICD-10-CM

## 2022-07-07 DIAGNOSIS — R71.0 DECREASED HEMOGLOBIN: Primary | ICD-10-CM

## 2022-07-07 DIAGNOSIS — K25.3 GASTRIC ULCER DUE TO HELICOBACTER PYLORI, ACUTE: ICD-10-CM

## 2022-07-07 DIAGNOSIS — Z11.4 SCREENING FOR HIV (HUMAN IMMUNODEFICIENCY VIRUS): ICD-10-CM

## 2022-07-07 DIAGNOSIS — L30.9 DERMATITIS: ICD-10-CM

## 2022-07-07 LAB
ALBUMIN SERPL-MCNC: 3.7 G/DL (ref 3.4–5)
ALP SERPL-CCNC: 69 U/L (ref 40–150)
ALT SERPL W P-5'-P-CCNC: 18 U/L (ref 0–50)
ANION GAP SERPL CALCULATED.3IONS-SCNC: 5 MMOL/L (ref 3–14)
AST SERPL W P-5'-P-CCNC: 17 U/L (ref 0–45)
BILIRUB SERPL-MCNC: 0.2 MG/DL (ref 0.2–1.3)
BUN SERPL-MCNC: 20 MG/DL (ref 7–30)
CALCIUM SERPL-MCNC: 9.1 MG/DL (ref 8.5–10.1)
CHLORIDE BLD-SCNC: 107 MMOL/L (ref 94–109)
CO2 SERPL-SCNC: 28 MMOL/L (ref 20–32)
CREAT SERPL-MCNC: 0.69 MG/DL (ref 0.52–1.04)
ERYTHROCYTE [DISTWIDTH] IN BLOOD BY AUTOMATED COUNT: 14.1 % (ref 10–15)
GFR SERPL CREATININE-BSD FRML MDRD: >90 ML/MIN/1.73M2
GLUCOSE BLD-MCNC: 80 MG/DL (ref 70–99)
HCT VFR BLD AUTO: 31.6 % (ref 35–47)
HGB BLD-MCNC: 10.1 G/DL (ref 11.7–15.7)
MCH RBC QN AUTO: 29.7 PG (ref 26.5–33)
MCHC RBC AUTO-ENTMCNC: 32 G/DL (ref 31.5–36.5)
MCV RBC AUTO: 93 FL (ref 78–100)
PLATELET # BLD AUTO: 293 10E3/UL (ref 150–450)
POTASSIUM BLD-SCNC: 3.9 MMOL/L (ref 3.4–5.3)
PROT SERPL-MCNC: 7 G/DL (ref 6.8–8.8)
RBC # BLD AUTO: 3.4 10E6/UL (ref 3.8–5.2)
SODIUM SERPL-SCNC: 140 MMOL/L (ref 133–144)
WBC # BLD AUTO: 6.9 10E3/UL (ref 4–11)

## 2022-07-07 PROCEDURE — 87389 HIV-1 AG W/HIV-1&-2 AB AG IA: CPT | Performed by: PHYSICIAN ASSISTANT

## 2022-07-07 PROCEDURE — 99214 OFFICE O/P EST MOD 30 MIN: CPT | Performed by: PHYSICIAN ASSISTANT

## 2022-07-07 PROCEDURE — 86803 HEPATITIS C AB TEST: CPT | Performed by: PHYSICIAN ASSISTANT

## 2022-07-07 PROCEDURE — 85027 COMPLETE CBC AUTOMATED: CPT | Performed by: PHYSICIAN ASSISTANT

## 2022-07-07 PROCEDURE — 80053 COMPREHEN METABOLIC PANEL: CPT | Performed by: PHYSICIAN ASSISTANT

## 2022-07-07 PROCEDURE — 36415 COLL VENOUS BLD VENIPUNCTURE: CPT | Performed by: PHYSICIAN ASSISTANT

## 2022-07-07 RX ORDER — OMEPRAZOLE 40 MG/1
40 CAPSULE, DELAYED RELEASE ORAL DAILY
Qty: 90 CAPSULE | Refills: 0 | Status: SHIPPED | OUTPATIENT
Start: 2022-07-07 | End: 2022-08-25

## 2022-07-07 RX ORDER — TRIAMCINOLONE ACETONIDE 1 MG/G
OINTMENT TOPICAL 2 TIMES DAILY PRN
Qty: 80 G | Refills: 1 | Status: SHIPPED | OUTPATIENT
Start: 2022-07-07 | End: 2022-08-25

## 2022-07-07 RX ORDER — AMOXICILLIN 500 MG/1
CAPSULE ORAL
COMMUNITY
Start: 2022-07-01 | End: 2022-08-25

## 2022-07-07 RX ORDER — CLARITHROMYCIN 500 MG
TABLET ORAL
COMMUNITY
Start: 2022-07-01 | End: 2022-08-25

## 2022-07-07 RX ORDER — OMEPRAZOLE 40 MG/1
CAPSULE, DELAYED RELEASE ORAL
COMMUNITY
Start: 2022-07-01 | End: 2022-08-25

## 2022-07-07 NOTE — PROGRESS NOTES
"  Assessment & Plan     Other acute gastritis with hemorrhage  Continue after finishing triple therapy  - omeprazole (PRILOSEC) 40 MG DR capsule; Take 1 capsule (40 mg) by mouth daily    Gastric ulcer due to Helicobacter pylori, acute  Continue omeprazole after finishing triple therapy  Will need to be off PPI in future to recheck HPylori  Low hemoglobin  Repeat labs, secondary to GI bleed  - CBC with platelets; Future  - Comprehensive metabolic panel (BMP + Alb, Alk Phos, ALT, AST, Total. Bili, TP); Future  - CBC with platelets  - Comprehensive metabolic panel (BMP + Alb, Alk Phos, ALT, AST, Total. Bili, TP)    Dermatitis  emollients  - triamcinolone (KENALOG) 0.1 % external ointment; Apply topically 2 times daily as needed for irritation (on hands/fingers)    Screening for HIV (human immunodeficiency virus)    - HIV Antigen Antibody Combo; Future  - HIV Antigen Antibody Combo    Need for hepatitis C screening test    - Hepatitis C Screen Reflex to HCV RNA Quant and Genotype; Future  - Hepatitis C Screen Reflex to HCV RNA Quant and Genotype             BMI:   Estimated body mass index is 32.08 kg/m  as calculated from the following:    Height as of this encounter: 1.537 m (5' 0.5\").    Weight as of this encounter: 75.8 kg (167 lb).           Return in about 2 months (around 9/7/2022) for stomach recheck .    Rita Cowart PA-C  [facility] OMARI Zee is a 61 year old, presenting for the following health issues:  Hospital F/U      [facility]       Hospital Follow-up Visit:    Hospital/Nursing Home/ Rehab Facility: [facility]  Date of Admission: 06/27/2022  Date of Discharge: 06/28/2022  Reason(s) for Admission: Upper GI bleed        Was your hospitalization related to COVID-19? No   Problems taking medications regularly:  Added medication  Medication changes since discharge: added  Problems adhering to non-medication therapy:  None    Summary of " hospitalization:  [facility] discharge summary reviewed  Diagnostic Tests/Treatments reviewed.  Follow up needed: repeat labs  Other Healthcare Providers Involved in Patient s Care:         None  Update since discharge: improved.  Post Discharge Medication Reconciliation: discharge medications reconciled, continue medications without change.  Plan of care communicated with patient     Dry, cracked skin on hands. over the counter products not helping    Review of Systems   Constitutional, HEENT, cardiovascular, pulmonary, gi and gu systems are negative, except as otherwise noted.      Objective    LMP 07/03/2007   There is no height or weight on file to calculate BMI.  Physical Exam   GENERAL APPEARANCE: healthy, alert and no distress  RESP: lungs clear to auscultation - no rales, rhonchi or wheezes  CV: regular rates and rhythm, normal S1 S2, no S3 or S4 and no murmur, click or rub  ABDOMEN: soft, nontender, without hepatosplenomegaly or masses and bowel sounds normal  SKIN: xerosis - fingers and hands  PSYCH: mentation appears normal and affect normal/bright                    .  ..

## 2022-07-08 LAB
HCV AB SERPL QL IA: NONREACTIVE
HIV 1+2 AB+HIV1 P24 AG SERPL QL IA: NONREACTIVE

## 2022-07-27 ENCOUNTER — HOSPITAL ENCOUNTER (OUTPATIENT)
Dept: MAMMOGRAPHY | Facility: CLINIC | Age: 62
Discharge: HOME | End: 2022-07-27
Attending: OBSTETRICS & GYNECOLOGY | Admitting: OBSTETRICS & GYNECOLOGY
Payer: COMMERCIAL

## 2022-07-27 DIAGNOSIS — Z12.31 VISIT FOR SCREENING MAMMOGRAM: ICD-10-CM

## 2022-07-27 PROCEDURE — 77067 SCR MAMMO BI INCL CAD: CPT

## 2022-08-25 ENCOUNTER — OFFICE VISIT (OUTPATIENT)
Dept: FAMILY MEDICINE | Facility: CLINIC | Age: 62
End: 2022-08-25
Payer: COMMERCIAL

## 2022-08-25 VITALS
RESPIRATION RATE: 12 BRPM | BODY MASS INDEX: 31.1 KG/M2 | HEART RATE: 71 BPM | WEIGHT: 169 LBS | HEIGHT: 62 IN | TEMPERATURE: 97.5 F | OXYGEN SATURATION: 96 % | SYSTOLIC BLOOD PRESSURE: 126 MMHG | DIASTOLIC BLOOD PRESSURE: 84 MMHG

## 2022-08-25 DIAGNOSIS — L30.9 DERMATITIS: ICD-10-CM

## 2022-08-25 DIAGNOSIS — K29.01 OTHER ACUTE GASTRITIS WITH HEMORRHAGE: Primary | ICD-10-CM

## 2022-08-25 DIAGNOSIS — R71.0 DECREASED HEMOGLOBIN: ICD-10-CM

## 2022-08-25 DIAGNOSIS — E78.5 HYPERLIPIDEMIA LDL GOAL <130: ICD-10-CM

## 2022-08-25 LAB
ERYTHROCYTE [DISTWIDTH] IN BLOOD BY AUTOMATED COUNT: 13.7 % (ref 10–15)
HCT VFR BLD AUTO: 39.6 % (ref 35–47)
HGB BLD-MCNC: 12.3 G/DL (ref 11.7–15.7)
MCH RBC QN AUTO: 27.7 PG (ref 26.5–33)
MCHC RBC AUTO-ENTMCNC: 31.1 G/DL (ref 31.5–36.5)
MCV RBC AUTO: 89 FL (ref 78–100)
PLATELET # BLD AUTO: 221 10E3/UL (ref 150–450)
RBC # BLD AUTO: 4.44 10E6/UL (ref 3.8–5.2)
WBC # BLD AUTO: 8.5 10E3/UL (ref 4–11)

## 2022-08-25 PROCEDURE — 80061 LIPID PANEL: CPT | Performed by: PHYSICIAN ASSISTANT

## 2022-08-25 PROCEDURE — 85027 COMPLETE CBC AUTOMATED: CPT | Performed by: PHYSICIAN ASSISTANT

## 2022-08-25 PROCEDURE — 99213 OFFICE O/P EST LOW 20 MIN: CPT | Performed by: PHYSICIAN ASSISTANT

## 2022-08-25 PROCEDURE — 36415 COLL VENOUS BLD VENIPUNCTURE: CPT | Performed by: PHYSICIAN ASSISTANT

## 2022-08-25 RX ORDER — TRIAMCINOLONE ACETONIDE 1 MG/G
OINTMENT TOPICAL 2 TIMES DAILY PRN
Qty: 80 G | Refills: 1 | Status: SHIPPED | OUTPATIENT
Start: 2022-08-25 | End: 2024-02-23 | Stop reason: ALTCHOICE

## 2022-08-25 NOTE — PROGRESS NOTES
"  Assessment & Plan     Other acute gastritis with hemorrhage  finsish 40 mg capsules. Then reduce dose to 20 mg if symptoms return, call and will Rx 40 mg again, otherwise, stay at 20 mg dos  - omeprazole (PRILOSEC) 20 MG DR capsule; Take 1 capsule (20 mg) by mouth daily    Decreased hemoglobin  Secondary to GI bleed, improving  - CBC with platelets    Hyperlipidemia LDL goal <130  Due   - Lipid panel reflex to direct LDL Non-fasting; Future  - Lipid panel reflex to direct LDL Non-fasting    Dermatitis  Emollients.   - triamcinolone (KENALOG) 0.1 % external ointment; Apply topically 2 times daily as needed for irritation (on hands/fingers)     BMI:   Estimated body mass index is 30.91 kg/m  as calculated from the following:    Height as of this encounter: 1.575 m (5' 2\").    Weight as of this encounter: 76.7 kg (169 lb).         No follow-ups on file.    AAKASH Boateng [facility]    Subjective   Saadia is a 61 year old, presenting for the following health issues:  RECHECK (Stomach)      HPI     Concern - Stomach follow up  Description: pt has no complaints, says she feels much better and that the medication is doing wonders          Review of Systems   Constitutional, HEENT, cardiovascular, pulmonary, gi and gu systems are negative, except as otherwise noted.      Objective    /84 (BP Location: Right arm, Patient Position: Sitting, Cuff Size: Adult Regular)   Pulse 71   Temp 97.5  F (36.4  C) (Oral)   Resp 12   Ht 1.575 m (5' 2\")   Wt 76.7 kg (169 lb)   LMP 07/03/2007   SpO2 96%   BMI 30.91 kg/m    Body mass index is 30.91 kg/m .  Physical Exam   GENERAL APPEARANCE: healthy, alert and no distress  ABDOMEN: soft, nontender, without hepatosplenomegaly or masses and bowel sounds normal                    .  ..  "

## 2022-08-26 LAB
CHOLEST SERPL-MCNC: 226 MG/DL
FASTING STATUS PATIENT QL REPORTED: ABNORMAL
HDLC SERPL-MCNC: 48 MG/DL
LDLC SERPL CALC-MCNC: 132 MG/DL
NONHDLC SERPL-MCNC: 178 MG/DL
TRIGL SERPL-MCNC: 228 MG/DL

## 2022-10-09 DIAGNOSIS — K29.01 OTHER ACUTE GASTRITIS WITH HEMORRHAGE: ICD-10-CM

## 2022-10-11 RX ORDER — OMEPRAZOLE 40 MG/1
CAPSULE, DELAYED RELEASE ORAL
Qty: 90 CAPSULE | Refills: 0 | OUTPATIENT
Start: 2022-10-11

## 2022-10-22 ENCOUNTER — HEALTH MAINTENANCE LETTER (OUTPATIENT)
Age: 62
End: 2022-10-22

## 2022-12-02 ENCOUNTER — OFFICE VISIT (OUTPATIENT)
Dept: URGENT CARE | Facility: URGENT CARE | Age: 62
End: 2022-12-02
Payer: COMMERCIAL

## 2022-12-02 VITALS
RESPIRATION RATE: 14 BRPM | DIASTOLIC BLOOD PRESSURE: 90 MMHG | HEART RATE: 89 BPM | OXYGEN SATURATION: 96 % | SYSTOLIC BLOOD PRESSURE: 149 MMHG | TEMPERATURE: 98.2 F

## 2022-12-02 DIAGNOSIS — H92.03 OTALGIA, BILATERAL: ICD-10-CM

## 2022-12-02 DIAGNOSIS — J01.90 ACUTE SINUSITIS WITH COEXISTING CONDITION REQUIRING PROPHYLACTIC TREATMENT: Primary | ICD-10-CM

## 2022-12-02 PROCEDURE — 99213 OFFICE O/P EST LOW 20 MIN: CPT | Performed by: FAMILY MEDICINE

## 2022-12-04 ENCOUNTER — HEALTH MAINTENANCE LETTER (OUTPATIENT)
Age: 62
End: 2022-12-04

## 2023-06-05 ENCOUNTER — ANCILLARY PROCEDURE (OUTPATIENT)
Dept: GENERAL RADIOLOGY | Facility: CLINIC | Age: 63
End: 2023-06-05
Attending: PHYSICIAN ASSISTANT
Payer: COMMERCIAL

## 2023-06-05 ENCOUNTER — OFFICE VISIT (OUTPATIENT)
Dept: URGENT CARE | Facility: URGENT CARE | Age: 63
End: 2023-06-05
Payer: COMMERCIAL

## 2023-06-05 VITALS
WEIGHT: 169 LBS | RESPIRATION RATE: 14 BRPM | BODY MASS INDEX: 30.91 KG/M2 | HEART RATE: 74 BPM | DIASTOLIC BLOOD PRESSURE: 72 MMHG | TEMPERATURE: 97.8 F | OXYGEN SATURATION: 96 % | SYSTOLIC BLOOD PRESSURE: 128 MMHG

## 2023-06-05 DIAGNOSIS — J20.9 ACUTE BRONCHITIS, UNSPECIFIED ORGANISM: ICD-10-CM

## 2023-06-05 DIAGNOSIS — J20.9 ACUTE BRONCHITIS, UNSPECIFIED ORGANISM: Primary | ICD-10-CM

## 2023-06-05 PROCEDURE — 99214 OFFICE O/P EST MOD 30 MIN: CPT | Performed by: PHYSICIAN ASSISTANT

## 2023-06-05 PROCEDURE — 71046 X-RAY EXAM CHEST 2 VIEWS: CPT | Mod: TC | Performed by: RADIOLOGY

## 2023-06-05 RX ORDER — ALBUTEROL SULFATE 90 UG/1
1-2 AEROSOL, METERED RESPIRATORY (INHALATION) EVERY 6 HOURS
Qty: 8.5 G | Refills: 0 | Status: SHIPPED | OUTPATIENT
Start: 2023-06-05 | End: 2024-02-23

## 2023-06-05 RX ORDER — PREDNISONE 20 MG/1
40 TABLET ORAL DAILY
Qty: 10 TABLET | Refills: 0 | Status: SHIPPED | OUTPATIENT
Start: 2023-06-05 | End: 2023-06-10

## 2023-06-05 NOTE — PROGRESS NOTES
URGENT CARE VISIT:    SUBJECTIVE:   Saadia Sorensen is a 62 year old female presenting with a chief complaint of stuffy nose, cough - productive and ear pain bilateral.  Onset was 7 day(s) ago.   She denies the following symptoms: fever, vomiting and diarrhea  Course of illness is worsening.    Treatment measures tried include OTC cod meds with no relief of symptoms.  Predisposing factors include None.    PMH:   Past Medical History:   Diagnosis Date     Calculus of kidney     x 2     Gastritis 12/17/2013     Allergies: Morphine and related   Medications:   Current Outpatient Medications   Medication Sig Dispense Refill     acetaminophen (TYLENOL) 325 MG tablet Take 650 mg by mouth every 6 hours as needed for mild pain       albuterol (PROAIR HFA/PROVENTIL HFA/VENTOLIN HFA) 108 (90 Base) MCG/ACT inhaler Inhale 1-2 puffs into the lungs every 6 hours for 5 days 8.5 g 0     Ascorbic Acid (VITAMIN C) 500 MG CHEW Take 1 tablet by mouth daily       predniSONE (DELTASONE) 20 MG tablet Take 2 tablets (40 mg) by mouth daily for 5 days 10 tablet 0     triamcinolone (KENALOG) 0.1 % external ointment Apply topically 2 times daily as needed for irritation (on hands/fingers) 80 g 1     Vitamin D (Cholecalciferol) 25 MCG (1000 UT) TABS Take 25 mcg by mouth daily       omeprazole (PRILOSEC) 20 MG DR capsule Take 1 capsule (20 mg) by mouth daily (Patient not taking: Reported on 6/5/2023) 90 capsule 3     Social History:   Social History     Tobacco Use     Smoking status: Never     Smokeless tobacco: Never   Vaping Use     Vaping status: Never Used   Substance Use Topics     Alcohol use: No       ROS:  General: negative  Skin: negative  Eyes: negative  Ears/Nose/Throat: negative  Respiratory: Cough  Cardiovascular: negative  Gastrointestinal: negative  Genitourinary: negative  Musculoskeletal: negative  Neurologic: negative      OBJECTIVE:  /72 (BP Location: Right arm, Patient Position: Chair, Cuff Size: Adult Regular)   Pulse 74    Temp 97.8  F (36.6  C) (Oral)   Resp 14   Wt 76.7 kg (169 lb)   LMP 07/03/2007   SpO2 96%   BMI 30.91 kg/m    GENERAL APPEARANCE: healthy, alert and no distress  EYES: EOMI,  PERRL, conjunctiva clear  HENT: ear canals and TM's normal.  Nose and mouth without ulcers, erythema or lesions  NECK: supple, nontender, no lymphadenopathy  RESP: expiratory wheezes throughout  CV: regular rates and rhythm, normal S1 S2, no murmur noted  SKIN: no suspicious lesions or rashes    Labs:    Results for orders placed or performed in visit on 06/05/23   XR Chest 2 Views     Status: None    Narrative    EXAM: XR CHEST 2 VIEWS  LOCATION: [facility]  DATE/TIME: 6/5/2023 7:07 PM CDT    INDICATION:  Acute bronchitis, unspecified organism  COMPARISON: 11/22/2016      Impression    IMPRESSION: Negative chest with no significant change.       ASSESSMENT:    ICD-10-CM    1. Acute bronchitis, unspecified organism  J20.9 XR Chest 2 Views     albuterol (PROAIR HFA/PROVENTIL HFA/VENTOLIN HFA) 108 (90 Base) MCG/ACT inhaler     predniSONE (DELTASONE) 20 MG tablet          PLAN:  Patient Instructions   Patient was educated on the natural course of condition. Take medications as directed. Side effects discussed. Conservative measures discussed including rest, increased fluids, humidifier, and over-the-counter cough suppressants. See your primary care provider if symptoms do not improve in 7 days. Seek emergency care if you develop shortness of breath or fever over 103.    Patient verbalized understanding and is agreeable to plan. The patient was discharged ambulatory and in stable condition.    Carrie Vasquez PA-C ....................  6/5/2023   7:26 PM

## 2023-06-06 NOTE — PATIENT INSTRUCTIONS
Patient was educated on the natural course of condition. Take medications as directed. Side effects discussed. Conservative measures discussed including rest, increased fluids, humidifier, and over-the-counter cough suppressants. See your primary care provider if symptoms do not improve in 7 days. Seek emergency care if you develop shortness of breath or fever over 103.

## 2023-06-14 ENCOUNTER — OFFICE VISIT (OUTPATIENT)
Dept: URGENT CARE | Facility: URGENT CARE | Age: 63
End: 2023-06-14
Payer: COMMERCIAL

## 2023-06-14 VITALS
RESPIRATION RATE: 18 BRPM | HEART RATE: 75 BPM | OXYGEN SATURATION: 98 % | SYSTOLIC BLOOD PRESSURE: 120 MMHG | TEMPERATURE: 97.7 F | DIASTOLIC BLOOD PRESSURE: 84 MMHG

## 2023-06-14 DIAGNOSIS — J01.90 ACUTE SINUSITIS WITH SYMPTOMS > 10 DAYS: Primary | ICD-10-CM

## 2023-06-14 DIAGNOSIS — R05.1 ACUTE COUGH: ICD-10-CM

## 2023-06-14 DIAGNOSIS — H10.31 ACUTE CONJUNCTIVITIS OF RIGHT EYE, UNSPECIFIED ACUTE CONJUNCTIVITIS TYPE: ICD-10-CM

## 2023-06-14 PROCEDURE — 99214 OFFICE O/P EST MOD 30 MIN: CPT | Performed by: FAMILY MEDICINE

## 2023-06-14 RX ORDER — BENZONATATE 100 MG/1
100-200 CAPSULE ORAL 3 TIMES DAILY PRN
Qty: 30 CAPSULE | Refills: 0 | Status: SHIPPED | OUTPATIENT
Start: 2023-06-14 | End: 2023-06-21

## 2023-06-14 NOTE — PROGRESS NOTES
ICD-10-CM    1. Acute sinusitis with symptoms > 10 days  J01.90 amoxicillin-clavulanate (AUGMENTIN) 875-125 MG tablet      2. Acute cough  R05.1 benzonatate (TESSALON) 100 MG capsule      3. Acute conjunctivitis of right eye, unspecified acute conjunctivitis type  H10.31         Symptoms not improving after 2 weeks, now with additional issue of R eye conjunctivitis.  Recent poor air quality could be contributing factor to pulmonary symptoms not significantly improving, but I think there's also been quite a bit of post-nasal drip associated with sinusitis that hasn't yet resolved.  Minimal bronchospasm on today's exam.  I do not think another round of oral steroid is indicated at this time, but pt should continue to use albuterol.    PLAN:  Patient Instructions   Augmentin twice daily for 10 days.  This should cover for bacterial infection in your right eye as well.    Tessalon:  use 1-2 pills at a time up to three times per day to reduce cough.    Continue to use albuterol inhaler as needed.    SUBJECTIVE:  Saadia Sorensen is a 62 year old female who presents to  today with ongoing occasionally productive cough x 2 weeks, sore throat for a few days, and now R eye redness.  Noticed some R ear soreness last night.  No fevers during the illness.  No vision changes, but did notice mattering of eyelashes upon waking this morning.  Drainage from R eye has been watery.  Has had sore throat for a few days as well.  Is clearing her throat a lot more often than usual.  No dental pain noted.  No significant facial pain/pressure.  Was seen a week ago and started albuterol and prednisone, which helped a little.    OBJECTIVE:  /84 (BP Location: Right arm, Patient Position: Sitting, Cuff Size: Adult Regular)   Pulse 75   Temp 97.7  F (36.5  C) (Tympanic)   Resp 18   LMP 07/03/2007   SpO2 98%   GEN: well-appearing, in NAD  EYES: R conjunctival injection throughout, EOMI, PERRL, small amount of watery drainage noted  ENT:  TMs normal, oral MMM, There is cobblestoning of posterior pharynx. No exudates  Neck: no LAD  Lungs:  Few expiratory wheezes, no crackles, good air entry bilaterally  CV:  RRR, no murmur noted

## 2023-06-14 NOTE — PATIENT INSTRUCTIONS
Augmentin twice daily for 10 days.  This should cover for bacterial infection in your right eye as well.    Tessalon:  use 1-2 pills at a time up to three times per day to reduce cough.    Continue to use albuterol inhaler as needed.   History of Present Illness  TCM Communication [facility]: The patient is being contacted for follow-up after hospitalization  Hospital records were reviewed  She was hospitalized at [facility] [place]  The date of admission: 10/27/17, date of discharge: 10/31/17  Diagnosis: RUQ/epigastric pain, diarrhea  She was discharged to home  Medications reviewed and updated today  She scheduled a follow up appointment  Communication performed and completed by      Active Problems    1  Acute diarrhea (787 91) (R19 7)  2  Acute otitis externa (380 10) (H60 509)  3  Acute otitis media (382 9) (H66 90)  4  Acute pain of left shoulder (719 41) (M25 512)  5  Atypical chest pain (786 59) (R07 89)  6  Bee sting allergy (V15 06) (Z91 038)  7  Benign hypertension (401 1) (I10)  8  Bilateral calf pain (729 5) (M79 661,M79 662)  9  Bipolar disorder (296 80) (F31 9)  10  Bunion (727 1) (M21 619)  11  C  difficile diarrhea (008 45) (A04 72)  12  Centrilobular emphysema (492 8) (J43 2)  13  Colonic polyp (211 3) (K63 5)  14  Common bile duct (CBD) obstruction (576 2) (K83 1)  15  Coronary artery spasm (413 9) (I20 1)  16  Disc degeneration, lumbar (722 52) (M51 36)  17  Fatigue (780 79) (R53 83)  18  Fecal incontinence (787 60) (R15 9)  19  Fecal urgency (787 63) (R15 2)  20  Hyperlipidemia (272 4) (E78 5)  21  Lumbar disc herniation (722 10) (M51 26)  22  Lumbar spinal stenosis (724 02) (M48 061)  23  New daily persistent headache (339 42) (G44 52)  24  Postoperative state (V45 89) (Z98 890)  25  Postprandial epigastric pain (789 06) (R10 13)  26  Rash (782 1) (R21)  27  Rheumatoid arthritis (714 0) (M06 9)  28  Temporal headache (784 0) (R51)    Loose stools (787 7) (R19 5)          Past Medical History   1  History of CAD (coronary artery disease) (414 00) (I25 10)  2  History of ovarian cancer (V10 43) (Z85 43)  3  Denied: History of substance abuse  4  Denied: History of Mental problems  5   History of Uterine carcinoma (179) (C55)    Surgical History   1  History of Anterior Sclera Fistulization For Glaucoma  2  History of Appendectomy  3  History of Biopsy Temporal Artery  4  History of Cholecystectomy  5  History of Remove Cataract, Corneo-Scleral Section Left Eye  6  History of Tonsillectomy With Adenoidectomy  7  History of Total Abdominal Hysterectomy With Bilateral Salpingo-Oophorectomy    Family History  Mother   1  Family history of Coronary artery disease  2  Family history of gangrene (V19 4) (Z84 0)  3  Family history of hypercholesterolemia (V18 19) (Z83 42)  4  Family history of malignant neoplasm of stomach (V16 0) (Z80 0)  5  Denied: Family history of mental disorder  6  Family history of myocardial infarction (V17 3) (Z82 49)  7  Family history of Peripheral arterial disease  Father   6  Family history of gangrene (V19 4) (Z84 0)  9  Family history of hypercholesterolemia (V18 19) (Z83 42)  8  Family history of malignant neoplasm of stomach (V16 0) (Z80 0)  11  Family history of myocardial infarction (V17 3) (Z82 49)  12  Denied: Family history of substance abuse  13  Family history of Peripheral arterial disease  Brother   15  Family history of malignant neoplasm of stomach (V16 0) (Z80 0)  15  Family history of myocardial infarction (V17 3) (Z82 49)  Maternal Uncle   12  Family history of malignant neoplasm of stomach (V16 0) (Z80 0)    Social History    · Current every day smoker (305 1) (F17 200)   · Disabled   ·    · No alcohol use   · No drug use    Current Meds  1  Cholestyramine 4 GM/DOSE Oral Powder; MIX 1 SCOOP IN LIQUID AND DRINK TWICE   DAILY; Therapy: 06SVW2959 to (Adriano Amaya)  Requested for: 82JDY0747; Last   Rx:05Oct2017 Ordered  2  Citalopram Hydrobromide 40 MG Oral Tablet; TAKE 1 TABLET DAILY; Therapy: 57HCT1646 to (Evaluate:22Jun2017); Last Rx:91Jsd2931 Ordered  3  ClonazePAM 0 5 MG Oral Tablet; take 1 tablet twice a day; Last Rx:40Zjh1377 Ordered  4   Gabapentin 600 MG Oral Tablet; TAKE 800 MG 3 times daily Recorded  5  Ibuprofen 600 MG Oral Tablet; TAKE 1 TABLET 3 TIMES DAILY WITH FOOD AS NEEDED; Therapy: 08KRL2872 to (Evaluate:04Mar2017)  Requested for: 22Feb2017; Last   Rx:13Gai9220 Ordered  6  Metoprolol Tartrate 25 MG Oral Tablet; Take 1 tablet twice daily; Therapy: 28KSU2504 to (Last Rx:04Apr2017)  Requested for: 04Apr2017 Ordered  7  Mirtazapine 30 MG Oral Tablet; TAKE 1 TABLET AT BEDTIME; Last Rx:22Feb2017   Ordered  8  Neomycin-Polymyxin-HC 3 5-13482-8 Otic Suspension; INSTILL 3 DROPS IN RIGHT   EAR 3-4 TIMES DAILY; Therapy: 65Rjv9605 to (Last [facility])  Requested for: 24Mqe2041 Ordered  9  Neurontin 800 MG Oral Tablet; TAKE 1 TABLET 3 TIMES DAILY; Last Rx:22Feb2017   Ordered  10  Nitroglycerin 0 4 MG Sublingual Tablet Sublingual; DISSOLVE 1 TABLET UNDER THE    TONGUE AS NEEDED FOR CHEST PAIN Recorded  11  Oxycodone-Acetaminophen 5-325 MG Oral Tablet; TAKE 1 TABLET every 4 hours as    needed for pain Recorded  12  Triamcinolone Acetonide 0 5 % External Cream; APPLY 2-3 TIMES DAILY TO AFFECTED    AREA(S); Therapy: 14GUH9367 to (Last Rx:05Oct2017)  Requested for: 05Oct2017 Ordered    Allergies   1  Aspirin TABS  2  Robaxin TABS  3  Tramadol    Health Management  Colonic polyp   COLONOSCOPY; every 5 years; Last 31ZGE8065; Next Due: 38HQP1899;  Active    Future Appointments    Date/Time Provider Specialty Site   12/07/2017 11:00 AM Valarie Parikh, [address] [facility]   11/07/2017 11:30 AM Rosette Valdes [facility] Internal [facility]     Signatures   Electronically signed by : AYAN Alonzo ; Nov 6 2017 10:45AM EST                       (Author)    Electronically signed by : AYAN Alonzo ; Nov 6 2017 10:45AM EST                       (Author)

## 2023-06-14 NOTE — LETTER
June 14, 2023      Saadia Sorensen  [address] [address] [address]        To Whom It May Concern:    Saadia Sorensen was seen in our clinic today.  She may return to work tomorrow after she has been on antibiotics for at least two doses.      Sincerely,        Talia Cheng MD

## 2023-10-24 LAB
ANION GAP SERPL CALCULATED.3IONS-SCNC: 10 MMOL/L (ref 7–15)
BASOPHILS # BLD AUTO: 0 10E3/UL (ref 0–0.2)
BASOPHILS NFR BLD AUTO: 0 %
BUN SERPL-MCNC: 17.6 MG/DL (ref 8–23)
CALCIUM SERPL-MCNC: 9.2 MG/DL (ref 8.8–10.2)
CHLORIDE SERPL-SCNC: 107 MMOL/L (ref 98–107)
CREAT SERPL-MCNC: 0.74 MG/DL (ref 0.51–0.95)
DEPRECATED HCO3 PLAS-SCNC: 26 MMOL/L (ref 22–29)
EGFRCR SERPLBLD CKD-EPI 2021: >90 ML/MIN/1.73M2
EOSINOPHIL # BLD AUTO: 0.4 10E3/UL (ref 0–0.7)
EOSINOPHIL NFR BLD AUTO: 4 %
ERYTHROCYTE [DISTWIDTH] IN BLOOD BY AUTOMATED COUNT: 14 % (ref 10–15)
GLUCOSE SERPL-MCNC: 150 MG/DL (ref 70–99)
HCT VFR BLD AUTO: 40.7 % (ref 35–47)
HGB BLD-MCNC: 13.1 G/DL (ref 11.7–15.7)
IMM GRANULOCYTES # BLD: 0 10E3/UL
IMM GRANULOCYTES NFR BLD: 0 %
LYMPHOCYTES # BLD AUTO: 3.7 10E3/UL (ref 0.8–5.3)
LYMPHOCYTES NFR BLD AUTO: 38 %
MCH RBC QN AUTO: 28.8 PG (ref 26.5–33)
MCHC RBC AUTO-ENTMCNC: 32.2 G/DL (ref 31.5–36.5)
MCV RBC AUTO: 90 FL (ref 78–100)
MONOCYTES # BLD AUTO: 0.6 10E3/UL (ref 0–1.3)
MONOCYTES NFR BLD AUTO: 6 %
NEUTROPHILS # BLD AUTO: 5 10E3/UL (ref 1.6–8.3)
NEUTROPHILS NFR BLD AUTO: 52 %
NRBC # BLD AUTO: 0 10E3/UL
NRBC BLD AUTO-RTO: 0 /100
PLATELET # BLD AUTO: 218 10E3/UL (ref 150–450)
POTASSIUM SERPL-SCNC: 3.9 MMOL/L (ref 3.4–5.3)
RBC # BLD AUTO: 4.55 10E6/UL (ref 3.8–5.2)
SODIUM SERPL-SCNC: 143 MMOL/L (ref 135–145)
TROPONIN T SERPL HS-MCNC: <6 NG/L
WBC # BLD AUTO: 9.6 10E3/UL (ref 4–11)

## 2023-10-24 PROCEDURE — 84484 ASSAY OF TROPONIN QUANT: CPT | Performed by: STUDENT IN AN ORGANIZED HEALTH CARE EDUCATION/TRAINING PROGRAM

## 2023-10-24 PROCEDURE — 85025 COMPLETE CBC W/AUTO DIFF WBC: CPT | Performed by: EMERGENCY MEDICINE

## 2023-10-24 PROCEDURE — 99284 EMERGENCY DEPT VISIT MOD MDM: CPT

## 2023-10-24 PROCEDURE — 80048 BASIC METABOLIC PNL TOTAL CA: CPT | Performed by: STUDENT IN AN ORGANIZED HEALTH CARE EDUCATION/TRAINING PROGRAM

## 2023-10-24 PROCEDURE — 85025 COMPLETE CBC W/AUTO DIFF WBC: CPT | Performed by: STUDENT IN AN ORGANIZED HEALTH CARE EDUCATION/TRAINING PROGRAM

## 2023-10-24 PROCEDURE — 93005 ELECTROCARDIOGRAM TRACING: CPT

## 2023-10-24 PROCEDURE — 36415 COLL VENOUS BLD VENIPUNCTURE: CPT | Performed by: EMERGENCY MEDICINE

## 2023-10-24 PROCEDURE — 80048 BASIC METABOLIC PNL TOTAL CA: CPT | Performed by: EMERGENCY MEDICINE

## 2023-10-24 PROCEDURE — 84484 ASSAY OF TROPONIN QUANT: CPT | Performed by: EMERGENCY MEDICINE

## 2023-10-25 ENCOUNTER — HOSPITAL ENCOUNTER (EMERGENCY)
Facility: CLINIC | Age: 63
Discharge: HOME | End: 2023-10-25
Attending: STUDENT IN AN ORGANIZED HEALTH CARE EDUCATION/TRAINING PROGRAM | Admitting: STUDENT IN AN ORGANIZED HEALTH CARE EDUCATION/TRAINING PROGRAM
Payer: COMMERCIAL

## 2023-10-25 VITALS
BODY MASS INDEX: 31.28 KG/M2 | HEIGHT: 62 IN | TEMPERATURE: 98.2 F | DIASTOLIC BLOOD PRESSURE: 86 MMHG | OXYGEN SATURATION: 98 % | WEIGHT: 170 LBS | SYSTOLIC BLOOD PRESSURE: 133 MMHG | RESPIRATION RATE: 18 BRPM | HEART RATE: 70 BPM

## 2023-10-25 DIAGNOSIS — R11.0 NAUSEA: ICD-10-CM

## 2023-10-25 DIAGNOSIS — R07.9 CHEST PAIN, UNSPECIFIED TYPE: Primary | ICD-10-CM

## 2023-10-25 LAB
ATRIAL RATE - MUSE: 76 BPM
DIASTOLIC BLOOD PRESSURE - MUSE: NORMAL MMHG
INTERPRETATION ECG - MUSE: NORMAL
P AXIS - MUSE: 57 DEGREES
PR INTERVAL - MUSE: 158 MS
QRS DURATION - MUSE: 82 MS
QT - MUSE: 372 MS
QTC - MUSE: 418 MS
R AXIS - MUSE: 54 DEGREES
SYSTOLIC BLOOD PRESSURE - MUSE: NORMAL MMHG
T AXIS - MUSE: 46 DEGREES
VENTRICULAR RATE- MUSE: 76 BPM

## 2023-10-25 NOTE — DISCHARGE INSTRUCTIONS
Thank you for allowing us to evaluate you today.  Follow up with primary care clinician  in 1 week for reevaluation..  Please read the guidance provided with your discharge instructions.  Immediately return to the emergency department with any concerns.      --------------------  Discharge Instructions  Chest Pain    You have been seen today for chest pain or discomfort.  At this time, your provider has found no signs that your chest pain is due to a serious or life-threatening condition, (or you have declined more testing and/or admission to the hospital). However, sometimes there is a serious problem that does not show up right away. Your evaluation today may not be complete and you may need further testing and evaluation.     Generally, every Emergency Department visit should have a follow-up clinic visit with either a primary or a specialty clinic/provider. Please follow-up as instructed by your emergency provider today.  Return to the Emergency Department if:  Your chest pain changes, gets worse, starts to happen more often, or comes with less activity.  You are newly short of breath.  You get very weak or tired.  You pass out or faint.  You have any new symptoms, like fever, cough, numb legs, or you cough up blood.  You have anything else that worries you.    Until you follow-up with your regular provider, please do the following:  Take one aspirin daily unless you have an allergy or are told not to by your provider.  If a stress test appointment has been made, go to the appointment.  If you have questions, contact your regular provider.  Follow-up with your regular provider/clinic as directed; this is very important.    If you were given a prescription for medicine here today, be sure to read all of the information (including the package insert) that comes with your prescription.  This will include important information about the medicine, its side effects, and any warnings that you need to know about.  The  pharmacist who fills the prescription can provide more information and answer questions you may have about the medicine.  If you have questions or concerns that the pharmacist cannot address, please call or return to the Emergency Department.       Remember that you can always come back to the Emergency Department if you are not able to see your regular provider in the amount of time listed above, if you get any new symptoms, or if there is anything that worries you.

## 2023-10-25 NOTE — ED PROVIDER NOTES
"History   Chief Complaint:  Chest Pain       HPI:  Saadia Sorensen is a very pleasant 63 year old female presenting with chest pain.  Patient has a history of hypercholesterolemia.  She arrives complaining of chest pain for couple of hours.  Chest pain is retrosternal, intermittent, comes like a pulsation, with tenderness to palpation over her mid chest, along with some nausea and shortness of breath with the pain.  When the pain resolves, she is not left with any residual symptoms.  No fever, chills, cough.  No abdominal pain, vomiting.  Patient does have a significant history of peptic ulcer disease.    Independent Historian:   None - Patient Only    Review of External Notes:   None.    Medications:    acetaminophen (TYLENOL) 325 MG tablet  albuterol (PROAIR HFA/PROVENTIL HFA/VENTOLIN HFA) 108 (90 Base) MCG/ACT inhaler  Ascorbic Acid (VITAMIN C) 500 MG CHEW  omeprazole (PRILOSEC) 20 MG DR capsule  triamcinolone (KENALOG) 0.1 % external ointment  Vitamin D (Cholecalciferol) 25 MCG (1000 UT) TABS        Past Medical History:    Past Medical History:   Diagnosis Date    Calculus of kidney     Gastritis 12/17/2013       Past Surgical History:    Past Surgical History:   Procedure Laterality Date    COLONOSCOPY      ENT SURGERY      ear drum ruptured and had surgery    ESOPHAGOSCOPY, GASTROSCOPY, DUODENOSCOPY (EGD), COMBINED N/A 6/28/2022    Procedure: ESOPHAGOGASTRODUODENOSCOPY, WITH BIOPSIES;  Surgeon: Niko Ren MD;  Location:  GI    [facility] NONSPECIFIC PROCEDURE      (L) TM-operation    [facility] NONSPECIFIC PROCEDURE      s/p spAb x 1        Physical Exam   Patient Vitals for the past 24 hrs:   BP Temp Temp src Pulse Resp SpO2 Height Weight   10/25/23 0155 -- -- -- -- -- -- 1.575 m (5' 2\") 77.1 kg (170 lb)   10/25/23 0130 133/86 -- -- 70 -- 98 % -- --   10/24/23 2217 (!) 165/89 98.2  F (36.8  C) Temporal 78 18 97 % -- --      Physical Exam  General: Alert, well-appearing female of stated age in no acute " distress  Eyes: No scleral icterus, no pallor  Neck: No jugular venous distension  Cardiovascular: Regular rate and rhythm, S1, S2, no murmurs, gallops or rubs.  Pulmonary: Easy work of breathing, lungs clear to auscultation bilaterally, no rales, rhonchi, wheeze  Chest: No chest wall tenderness to palpation  Abdominal: Soft, non-tender, no guarding or rebound.    Skin: Warm and dry, no pallor, no rash  Neuro: Alert and oriented, normal gait    Emergency Department Course   ECG  ECG results from 10/25/23   EKG 12-lead, tracing only     Value    Systolic Blood Pressure     Diastolic Blood Pressure     Ventricular Rate 76    Atrial Rate 76    RI Interval 158    QRS Duration 82        QTc 418    P Axis 57    R AXIS 54    T Axis 46    Interpretation ECG      Sinus rhythm  Nonspecific T wave abnormality  Abnormal ECG  When compared with ECG of 27-JUN-2022 12:20,  No significant change was found          Imaging:  No orders to display      None performed    Laboratory:  Labs Ordered and Resulted from Time of ED Arrival to Time of ED Departure   BASIC METABOLIC PANEL - Abnormal       Result Value    Sodium 143      Potassium 3.9      Chloride 107      Carbon Dioxide (CO2) 26      Anion Gap 10      Urea Nitrogen 17.6      Creatinine 0.74      GFR Estimate >90      Calcium 9.2      Glucose 150 (*)    TROPONIN T, HIGH SENSITIVITY - Normal    Troponin T, High Sensitivity <6     CBC WITH PLATELETS AND DIFFERENTIAL    WBC Count 9.6      RBC Count 4.55      Hemoglobin 13.1      Hematocrit 40.7      MCV 90      MCH 28.8      MCHC 32.2      RDW 14.0      Platelet Count 218      % Neutrophils 52      % Lymphocytes 38      % Monocytes 6      % Eosinophils 4      % Basophils 0      % Immature Granulocytes 0      NRBCs per 100 WBC 0      Absolute Neutrophils 5.0      Absolute Lymphocytes 3.7      Absolute Monocytes 0.6      Absolute Eosinophils 0.4      Absolute Basophils 0.0      Absolute Immature Granulocytes 0.0      Absolute  NRBCs 0.0          Procedures   None performed       Emergency Department Course & Assessments:             Interventions:  Medications - No data to display     Independent Interpretation (X-rays, CTs, rhythm strip):  None    Consultations/Discussion of Management or Tests:  None    Social Determinants of Health affecting care:   None.      Disposition:  The patient was discharged to home.     Impression & Plan   CMS Diagnoses: None       Medical Decision Making:   Patient presenting with chest pain.  Considered differential including acute coronary syndrome, tach arrhythmia, symptomatic anemia, esophageal spasm, esophagitis/gastritis, among others.  Low suspicion for pulmonary embolism given intermittent nature of patient's pain with symptoms fully resolving in between times.  Also low suspicion for aortic dissection for the same reasons.  Work-up here was reassuring.  EKG had no acute appearing ischemic changes and showed sinus rhythm.  Troponin was less than 6, indicative of no myocardial injury.  Patient had no anemia or electrolyte derangement.  Overall reassuring work-up.  My impression is possible reflux versus esophageal spasm.  Did discuss restarting the patient on omeprazole, which she is no longer taking.  She would prefer to hold off and follow-up with her primary care physician to discuss.  I do think this is reasonable. Findings were discussed.   Additional verbal instructions were provided.   I discussed specific warning signs and instructed the patient to return to the emergency department if there are any concerns.  Understanding of instructions was voiced, questions were answered and the patient was discharged.     Critical Care time was 0 minutes for this patient excluding procedures.       Diagnosis:    ICD-10-CM    1. Chest pain, unspecified type  R07.9       2. Nausea  R11.0            Discharge Medications:  Discharge Medication List as of 10/25/2023  1:53 AM            Venancio Rosales  MD  10/25/23 0229

## 2023-10-25 NOTE — ED TRIAGE NOTES
Arrives from home. States that for the last couple of hours has had chest pain. With associated dizziness and nausea.   States SOB. States pain is located on the left side. States pain is worse with touch.     Denies medications prior to arrival. Denies fevers prior to arrival.   Denies cardiac history.

## 2023-10-27 ENCOUNTER — OFFICE VISIT (OUTPATIENT)
Dept: FAMILY MEDICINE | Facility: CLINIC | Age: 63
End: 2023-10-27
Payer: COMMERCIAL

## 2023-10-27 ENCOUNTER — ANCILLARY PROCEDURE (OUTPATIENT)
Dept: GENERAL RADIOLOGY | Facility: CLINIC | Age: 63
End: 2023-10-27
Payer: COMMERCIAL

## 2023-10-27 VITALS
HEART RATE: 67 BPM | SYSTOLIC BLOOD PRESSURE: 138 MMHG | TEMPERATURE: 97.4 F | HEIGHT: 62 IN | OXYGEN SATURATION: 94 % | BODY MASS INDEX: 32.68 KG/M2 | WEIGHT: 177.6 LBS | DIASTOLIC BLOOD PRESSURE: 86 MMHG | RESPIRATION RATE: 14 BRPM

## 2023-10-27 DIAGNOSIS — R07.89 OTHER CHEST PAIN: Primary | ICD-10-CM

## 2023-10-27 DIAGNOSIS — R07.89 OTHER CHEST PAIN: ICD-10-CM

## 2023-10-27 DIAGNOSIS — Z83.49 FAMILY HISTORY OF THYROID DISEASE: ICD-10-CM

## 2023-10-27 DIAGNOSIS — E78.5 HYPERLIPIDEMIA LDL GOAL <130: ICD-10-CM

## 2023-10-27 DIAGNOSIS — Z12.11 SCREEN FOR COLON CANCER: ICD-10-CM

## 2023-10-27 LAB
ANION GAP SERPL CALCULATED.3IONS-SCNC: 11 MMOL/L (ref 7–15)
BUN SERPL-MCNC: 12 MG/DL (ref 8–23)
CALCIUM SERPL-MCNC: 9.2 MG/DL (ref 8.8–10.2)
CHLORIDE SERPL-SCNC: 102 MMOL/L (ref 98–107)
CHOLEST SERPL-MCNC: 301 MG/DL
CREAT SERPL-MCNC: 0.75 MG/DL (ref 0.51–0.95)
DEPRECATED HCO3 PLAS-SCNC: 26 MMOL/L (ref 22–29)
EGFRCR SERPLBLD CKD-EPI 2021: 90 ML/MIN/1.73M2
GLUCOSE SERPL-MCNC: 89 MG/DL (ref 70–99)
HDLC SERPL-MCNC: 52 MG/DL
LDLC SERPL CALC-MCNC: 226 MG/DL
NONHDLC SERPL-MCNC: 249 MG/DL
POTASSIUM SERPL-SCNC: 4.2 MMOL/L (ref 3.4–5.3)
SODIUM SERPL-SCNC: 139 MMOL/L (ref 135–145)
TRIGL SERPL-MCNC: 113 MG/DL
TSH SERPL DL<=0.005 MIU/L-ACNC: 2.48 UIU/ML (ref 0.3–4.2)

## 2023-10-27 PROCEDURE — 71046 X-RAY EXAM CHEST 2 VIEWS: CPT | Mod: TC | Performed by: RADIOLOGY

## 2023-10-27 PROCEDURE — 36415 COLL VENOUS BLD VENIPUNCTURE: CPT

## 2023-10-27 PROCEDURE — 84443 ASSAY THYROID STIM HORMONE: CPT

## 2023-10-27 PROCEDURE — 99214 OFFICE O/P EST MOD 30 MIN: CPT

## 2023-10-27 PROCEDURE — 80061 LIPID PANEL: CPT

## 2023-10-27 PROCEDURE — 80048 BASIC METABOLIC PNL TOTAL CA: CPT

## 2023-10-27 RX ORDER — TRIAMCINOLONE ACETONIDE 1 MG/G
OINTMENT TOPICAL 2 TIMES DAILY PRN
Qty: 80 G | Refills: 1 | Status: CANCELLED | OUTPATIENT
Start: 2023-10-27

## 2023-10-27 NOTE — PROGRESS NOTES
"  Assessment & Plan     (R07.89) Other chest pain  (primary encounter diagnosis)  Unclear etiology. Was seen in the Emergency Department with negative troponin and stable EKG. Given ongoing symptoms will order stress echocardiogram to evaluate for ischemia. She has had elevated LDL in the past, has not been checked recently so will recheck today as patient is fasting. Will also check blood work. Chest XR done in the clinic which was negative for infection, mass or other abnormalities. Discussed with patient that if symptoms are worsening or she develops more severe chest pain she should be seen again in the Emergency Department. Follow up in clinic in 1-2 weeks for recheck after stress echocardiogram and labs.  Plan: Lipid panel reflex to direct LDL Fasting, Basic        metabolic panel  (Ca, Cl, CO2, Creat, Gluc, K,         Na, BUN), Echocardiogram Exercise Stress, XR         Chest 2 Views          (E78.5) Hyperlipidemia LDL goal <130  Check lipid panel as patient is fasting. She has had elevated cholesterol in the past, not on medication for cholesterol.  Plan: REVIEW OF HEALTH MAINTENANCE PROTOCOL ORDERS,         Lipid panel reflex to direct LDL Fasting          (Z83.49) Family history of thyroid disease  Will check Tsh given family history of thyroid disease. With chest discomfort and other symptoms will also make sure TSH normal/unchanged.   Plan: TSH with free T4 reflex          (Z12.11) Screen for colon cancer  Plan: Colonoscopy Screening  Referral           MED REC REQUIRED  Post Medication Reconciliation Status: patient was not discharged from an inpatient facility or TCU    BMI:   Estimated body mass index is 32.48 kg/m  as calculated from the following:    Height as of this encounter: 1.575 m (5' 2\").    Weight as of this encounter: 80.6 kg (177 lb 9.6 oz).     Follow up in 1-2 weeks for repeat evaluation; sooner with any acute concerns.    Radha Starks PA-C  [facility] " "OMARI Zee is a 63 year old, presenting for the following health issues:  Er F/u        10/27/2023     1:26 PM   Additional Questions   Roomed by Gissell DELGADO     ED/UC Followup:  Facility: [facility] Emergency Dept   Date of visit: 10/24/2023  Reason for visit: Chest pain, nausea  Current Status: Pt is still experiencing sudden sharp chest pains, experiences SOB and states pain radiates down her L arm leaving her breathless, nauseous and dizzy   Continues to have intermittent chest pain which radiates and nausea. Does not seem to be linked with exercise/exertion. Will occasionally get dizziness and diaphoresis with these episodes. Today she had an episode which lasted approximately 7 minutes before spontaneously resolving. No history of similar chest pains.        Review of Systems   Constitutional, HEENT, cardiovascular, pulmonary, GI, , musculoskeletal, neuro, skin, endocrine and psych systems are negative, except as otherwise noted.        Objective    /86 (BP Location: Right arm, Patient Position: Sitting, Cuff Size: Adult Regular)   Pulse 67   Temp 97.4  F (36.3  C) (Oral)   Resp 14   Ht 1.575 m (5' 2\")   Wt 80.6 kg (177 lb 9.6 oz)   LMP 07/03/2007   SpO2 94%   BMI 32.48 kg/m    Body mass index is 32.48 kg/m .  Physical Exam   GENERAL: healthy, alert and no distress  RESP: lungs clear to auscultation - no rales, rhonchi or wheezes  CV: regular rate and rhythm, normal S1 S2, no S3 or S4, no murmur, click or rub  MS: no gross musculoskeletal defects noted, no edema        "

## 2023-10-31 ENCOUNTER — HOSPITAL ENCOUNTER (OUTPATIENT)
Dept: MAMMOGRAPHY | Facility: CLINIC | Age: 63
Discharge: HOME | End: 2023-10-31
Attending: OBSTETRICS & GYNECOLOGY | Admitting: OBSTETRICS & GYNECOLOGY
Payer: COMMERCIAL

## 2023-10-31 DIAGNOSIS — Z12.31 VISIT FOR SCREENING MAMMOGRAM: ICD-10-CM

## 2023-10-31 PROCEDURE — 77067 SCR MAMMO BI INCL CAD: CPT

## 2023-11-01 ENCOUNTER — VIRTUAL VISIT (OUTPATIENT)
Dept: FAMILY MEDICINE | Facility: CLINIC | Age: 63
End: 2023-11-01
Payer: COMMERCIAL

## 2023-11-01 DIAGNOSIS — E78.5 HYPERLIPIDEMIA LDL GOAL <130: Primary | ICD-10-CM

## 2023-11-01 PROCEDURE — 99442 PR PHYSICIAN TELEPHONE EVALUATION 11-20 MIN: CPT

## 2023-11-01 RX ORDER — ROSUVASTATIN CALCIUM 10 MG/1
10 TABLET, COATED ORAL DAILY
Qty: 90 TABLET | Refills: 0 | Status: SHIPPED | OUTPATIENT
Start: 2023-11-01 | End: 2024-01-29

## 2023-11-01 NOTE — PROGRESS NOTES
"Saadia is a 63 year old who is being evaluated via a billable telephone visit.      What phone number would you like to be contacted at? [phone]  How would you like to obtain your AVS? Seema    Distant Location (provider location):  On-site    Assessment & Plan     (E78.5) Hyperlipidemia LDL goal <130  (primary encounter diagnosis)  Comment: patient ASCVD risk score 6.4% however, given patient has been having consistent chest pain shared decision to start on rosuvastatin 10 mg daily. Discussed medication risks and benefits of Rosuvastatin with patient in detail with patient verbal understanding. Will have patient follow up in 3 months for repeat lab work. Discussed dietary and lifestyle measures to incorporate as well.  Patient fully understands and is agreeable with plan of care, at this point patient will follow up as needed unless acute concerns arise in the meantime.  Plan: rosuvastatin (CRESTOR) 10 MG tablet, Lipid         panel reflex to direct LDL Fasting         BMI:   Estimated body mass index is 32.48 kg/m  as calculated from the following:    Height as of 10/27/23: 1.575 m (5' 2\").    Weight as of 10/27/23: 80.6 kg (177 lb 9.6 oz).   Weight management plan: Discussed healthy diet and exercise guidelines    REY Shafer CNP  [facility]   Saadia is a 63 year old, presenting for the following health issues:  Lipids      11/1/2023     2:36 PM   Additional Questions   Roomed by Jigna COTTON CMA       HPI     Hyperlipidemia Follow-Up    Are you regularly taking any medication or supplement to lower your cholesterol?   No  Are you having muscle aches or other side effects that you think could be caused by your cholesterol lowering medication?  NA, but was on simvastatin and pravastatin previously that made her shake and other side effects.     Patient has history of chest pain   Longest episode 7 minutes  No episodes of chest pain recently   Recent elevated lipid " "panel.     The 10-year ASCVD risk score (Fran BROOKS, et al., 2019) is: 6.4%    Values used to calculate the score:      Age: 63 years      Sex: Female      Is Non- : No      Diabetic: No      Tobacco smoker: No      Systolic Blood Pressure: 138 mmHg      Is BP treated: No      HDL Cholesterol: 52 mg/dL      Total Cholesterol: 301 mg/dL    Review of Systems   Constitutional, HEENT, cardiovascular, pulmonary, gi and gu systems are negative, except as otherwise noted.      Objective    Vitals - Patient Reported  Weight (Patient Reported): 80.3 kg (177 lb)  Height (Patient Reported): 157.5 cm (5' 2\")  BMI (Based on Pt Reported Ht/Wt): 32.37    Physical Exam   healthy, alert, and no distress  PSYCH: Alert and oriented times 3; coherent speech, normal   rate and volume, able to articulate logical thoughts, able   to abstract reason, no tangential thoughts, no hallucinations   or delusions  Her affect is normal and pleasant  RESP: No cough, no audible wheezing, able to talk in full sentences  Remainder of exam unable to be completed due to telephone visits    Phone call duration: 16 minutes      "

## 2023-11-09 ENCOUNTER — HOSPITAL ENCOUNTER (OUTPATIENT)
Dept: CARDIOLOGY | Facility: CLINIC | Age: 63
Discharge: HOME | End: 2023-11-09
Payer: COMMERCIAL

## 2023-11-09 DIAGNOSIS — R07.89 OTHER CHEST PAIN: ICD-10-CM

## 2023-11-09 PROCEDURE — 255N000002 HC RX 255 OP 636

## 2023-11-09 PROCEDURE — 93350 STRESS TTE ONLY: CPT | Mod: 26 | Performed by: INTERNAL MEDICINE

## 2023-11-09 PROCEDURE — 93321 DOPPLER ECHO F-UP/LMTD STD: CPT | Mod: 26 | Performed by: INTERNAL MEDICINE

## 2023-11-09 PROCEDURE — 93017 CV STRESS TEST TRACING ONLY: CPT

## 2023-11-09 PROCEDURE — 93018 CV STRESS TEST I&R ONLY: CPT | Performed by: INTERNAL MEDICINE

## 2023-11-09 PROCEDURE — 93325 DOPPLER ECHO COLOR FLOW MAPG: CPT | Mod: 26 | Performed by: INTERNAL MEDICINE

## 2023-11-09 PROCEDURE — 93325 DOPPLER ECHO COLOR FLOW MAPG: CPT | Mod: TC

## 2023-11-09 PROCEDURE — 93016 CV STRESS TEST SUPVJ ONLY: CPT | Performed by: INTERNAL MEDICINE

## 2023-11-09 RX ADMIN — HUMAN ALBUMIN MICROSPHERES AND PERFLUTREN 6 ML: 10; .22 INJECTION, SOLUTION INTRAVENOUS at 14:35

## 2023-11-28 ENCOUNTER — TRANSFERRED RECORDS (OUTPATIENT)
Dept: HEALTH INFORMATION MANAGEMENT | Facility: CLINIC | Age: 63
End: 2023-11-28
Payer: COMMERCIAL

## 2024-01-01 ENCOUNTER — TRANSFERRED RECORDS (OUTPATIENT)
Dept: MULTI SPECIALTY CLINIC | Facility: CLINIC | Age: 64
End: 2024-01-01

## 2024-01-01 LAB — PAP SMEAR - HIM PATIENT REPORTED: NEGATIVE

## 2024-01-14 ENCOUNTER — HEALTH MAINTENANCE LETTER (OUTPATIENT)
Age: 64
End: 2024-01-14

## 2024-01-29 DIAGNOSIS — E78.5 HYPERLIPIDEMIA LDL GOAL <130: ICD-10-CM

## 2024-01-29 RX ORDER — ROSUVASTATIN CALCIUM 10 MG/1
10 TABLET, COATED ORAL DAILY
Qty: 90 TABLET | Refills: 0 | Status: SHIPPED | OUTPATIENT
Start: 2024-01-29 | End: 2024-02-23

## 2024-01-29 NOTE — TELEPHONE ENCOUNTER
Due for follow up labs. Refill provided. Can you help patient get lab only scheduled to recheck lipid levels?

## 2024-01-29 NOTE — TELEPHONE ENCOUNTER
Appt scheduled for 2/23/2024, pt opted for AWV and to have fasting labs done at that time.  Jadyn Miranda, TC

## 2024-02-21 SDOH — HEALTH STABILITY: PHYSICAL HEALTH: ON AVERAGE, HOW MANY DAYS PER WEEK DO YOU ENGAGE IN MODERATE TO STRENUOUS EXERCISE (LIKE A BRISK WALK)?: 4 DAYS

## 2024-02-21 SDOH — HEALTH STABILITY: PHYSICAL HEALTH: ON AVERAGE, HOW MANY MINUTES DO YOU ENGAGE IN EXERCISE AT THIS LEVEL?: 20 MIN

## 2024-02-21 ASSESSMENT — SOCIAL DETERMINANTS OF HEALTH (SDOH)
DO YOU BELONG TO ANY CLUBS OR ORGANIZATIONS SUCH AS CHURCH GROUPS UNIONS, FRATERNAL OR ATHLETIC GROUPS, OR SCHOOL GROUPS?: NO
IN A TYPICAL WEEK, HOW MANY TIMES DO YOU TALK ON THE PHONE WITH FAMILY, FRIENDS, OR NEIGHBORS?: ONCE A WEEK
HOW OFTEN DO YOU ATTEND CHURCH OR RELIGIOUS SERVICES?: MORE THAN 4 TIMES PER YEAR
HOW OFTEN DO YOU GET TOGETHER WITH FRIENDS OR RELATIVES?: ONCE A WEEK
HOW OFTEN DO YOU ATTENT MEETINGS OF THE CLUB OR ORGANIZATION YOU BELONG TO?: NEVER
HOW OFTEN DO YOU GET TOGETHER WITH FRIENDS OR RELATIVES?: ONCE A WEEK

## 2024-02-21 ASSESSMENT — LIFESTYLE VARIABLES
HOW OFTEN DO YOU HAVE SIX OR MORE DRINKS ON ONE OCCASION: NEVER
SKIP TO QUESTIONS 9-10: 1
HOW MANY STANDARD DRINKS CONTAINING ALCOHOL DO YOU HAVE ON A TYPICAL DAY: PATIENT DOES NOT DRINK
HOW OFTEN DO YOU HAVE A DRINK CONTAINING ALCOHOL: NEVER
AUDIT-C TOTAL SCORE: 0

## 2024-02-23 ENCOUNTER — OFFICE VISIT (OUTPATIENT)
Dept: FAMILY MEDICINE | Facility: CLINIC | Age: 64
End: 2024-02-23
Payer: COMMERCIAL

## 2024-02-23 VITALS
RESPIRATION RATE: 12 BRPM | HEIGHT: 63 IN | OXYGEN SATURATION: 94 % | HEART RATE: 87 BPM | WEIGHT: 177 LBS | SYSTOLIC BLOOD PRESSURE: 139 MMHG | TEMPERATURE: 98.1 F | DIASTOLIC BLOOD PRESSURE: 86 MMHG | BODY MASS INDEX: 31.36 KG/M2

## 2024-02-23 DIAGNOSIS — K29.01 OTHER ACUTE GASTRITIS WITH HEMORRHAGE: ICD-10-CM

## 2024-02-23 DIAGNOSIS — E78.5 HYPERLIPIDEMIA LDL GOAL <130: ICD-10-CM

## 2024-02-23 DIAGNOSIS — H69.93 DYSFUNCTION OF BOTH EUSTACHIAN TUBES: ICD-10-CM

## 2024-02-23 DIAGNOSIS — M25.512 LEFT SHOULDER PAIN, UNSPECIFIED CHRONICITY: ICD-10-CM

## 2024-02-23 DIAGNOSIS — L30.9 DERMATITIS: ICD-10-CM

## 2024-02-23 DIAGNOSIS — R53.83 OTHER FATIGUE: ICD-10-CM

## 2024-02-23 DIAGNOSIS — Z00.00 ROUTINE GENERAL MEDICAL EXAMINATION AT A HEALTH CARE FACILITY: Primary | ICD-10-CM

## 2024-02-23 LAB
ALBUMIN SERPL BCG-MCNC: 4.7 G/DL (ref 3.5–5.2)
ALP SERPL-CCNC: 84 U/L (ref 40–150)
ALT SERPL W P-5'-P-CCNC: 15 U/L (ref 0–50)
ANION GAP SERPL CALCULATED.3IONS-SCNC: 12 MMOL/L (ref 7–15)
AST SERPL W P-5'-P-CCNC: 21 U/L (ref 0–45)
BILIRUB SERPL-MCNC: 0.5 MG/DL
BUN SERPL-MCNC: 12.8 MG/DL (ref 8–23)
CALCIUM SERPL-MCNC: 9.6 MG/DL (ref 8.8–10.2)
CHLORIDE SERPL-SCNC: 102 MMOL/L (ref 98–107)
CHOLEST SERPL-MCNC: 185 MG/DL
CREAT SERPL-MCNC: 0.71 MG/DL (ref 0.51–0.95)
CRP SERPL-MCNC: 10.6 MG/L
DEPRECATED HCO3 PLAS-SCNC: 26 MMOL/L (ref 22–29)
EGFRCR SERPLBLD CKD-EPI 2021: >90 ML/MIN/1.73M2
ERYTHROCYTE [DISTWIDTH] IN BLOOD BY AUTOMATED COUNT: 13.2 % (ref 10–15)
FASTING STATUS PATIENT QL REPORTED: YES
GLUCOSE SERPL-MCNC: 88 MG/DL (ref 70–99)
HBA1C MFR BLD: 5.7 % (ref 0–5.6)
HCT VFR BLD AUTO: 43.1 % (ref 35–47)
HDLC SERPL-MCNC: 60 MG/DL
HGB BLD-MCNC: 13.7 G/DL (ref 11.7–15.7)
HOLD SPECIMEN: NORMAL
LDLC SERPL CALC-MCNC: 108 MG/DL
MCH RBC QN AUTO: 28.5 PG (ref 26.5–33)
MCHC RBC AUTO-ENTMCNC: 31.8 G/DL (ref 31.5–36.5)
MCV RBC AUTO: 90 FL (ref 78–100)
NONHDLC SERPL-MCNC: 125 MG/DL
PLATELET # BLD AUTO: 235 10E3/UL (ref 150–450)
POTASSIUM SERPL-SCNC: 4 MMOL/L (ref 3.4–5.3)
PROT SERPL-MCNC: 7.7 G/DL (ref 6.4–8.3)
RBC # BLD AUTO: 4.81 10E6/UL (ref 3.8–5.2)
SODIUM SERPL-SCNC: 140 MMOL/L (ref 135–145)
TRIGL SERPL-MCNC: 86 MG/DL
TSH SERPL DL<=0.005 MIU/L-ACNC: 2.8 UIU/ML (ref 0.3–4.2)
VIT D+METAB SERPL-MCNC: 32 NG/ML (ref 20–50)
WBC # BLD AUTO: 9.3 10E3/UL (ref 4–11)

## 2024-02-23 PROCEDURE — 80053 COMPREHEN METABOLIC PANEL: CPT | Performed by: PHYSICIAN ASSISTANT

## 2024-02-23 PROCEDURE — 36415 COLL VENOUS BLD VENIPUNCTURE: CPT | Performed by: PHYSICIAN ASSISTANT

## 2024-02-23 PROCEDURE — 86140 C-REACTIVE PROTEIN: CPT | Performed by: PHYSICIAN ASSISTANT

## 2024-02-23 PROCEDURE — 85027 COMPLETE CBC AUTOMATED: CPT | Performed by: PHYSICIAN ASSISTANT

## 2024-02-23 PROCEDURE — 82306 VITAMIN D 25 HYDROXY: CPT | Performed by: PHYSICIAN ASSISTANT

## 2024-02-23 PROCEDURE — 83036 HEMOGLOBIN GLYCOSYLATED A1C: CPT | Performed by: PHYSICIAN ASSISTANT

## 2024-02-23 PROCEDURE — 99214 OFFICE O/P EST MOD 30 MIN: CPT | Mod: 25 | Performed by: PHYSICIAN ASSISTANT

## 2024-02-23 PROCEDURE — 80061 LIPID PANEL: CPT | Performed by: PHYSICIAN ASSISTANT

## 2024-02-23 PROCEDURE — 99396 PREV VISIT EST AGE 40-64: CPT | Performed by: PHYSICIAN ASSISTANT

## 2024-02-23 PROCEDURE — 84443 ASSAY THYROID STIM HORMONE: CPT | Performed by: PHYSICIAN ASSISTANT

## 2024-02-23 RX ORDER — ROSUVASTATIN CALCIUM 10 MG/1
10 TABLET, COATED ORAL DAILY
Qty: 90 TABLET | Refills: 3 | Status: SHIPPED | OUTPATIENT
Start: 2024-02-23

## 2024-02-23 RX ORDER — BETAMETHASONE DIPROPIONATE 0.5 MG/G
OINTMENT, AUGMENTED TOPICAL 2 TIMES DAILY
Qty: 50 G | Refills: 1 | Status: SHIPPED | OUTPATIENT
Start: 2024-02-23

## 2024-02-23 NOTE — Clinical Note
Please abstract the following data from this visit with this patient into the appropriate field in Epic:  Tests that can be patient reported without a hard copy:  Pap smear done on this date: 01/2024 (approximately), by this group: [facility], results were normal.

## 2024-02-23 NOTE — PATIENT INSTRUCTIONS
Preventive Care Advice   This is general advice given by our system to help you stay healthy. However, your care team may have specific advice just for you. Please talk to your care team about your preventive care needs.  Nutrition  Eat 5 or more servings of fruits and vegetables each day.  Try wheat bread, brown rice and whole grain pasta (instead of white bread, rice, and pasta).  Get enough calcium and vitamin D. Check the label on foods and aim for 100% of the RDA (recommended daily allowance).  Lifestyle  Exercise at least 150 minutes each week  (30 minutes a day, 5 days a week).  Do muscle strengthening activities 2 days a week. These help control your weight and prevent disease.  No smoking.  Wear sunscreen to prevent skin cancer.  Have a dental exam and cleaning every 6 months.  Yearly exams  See your health care team every year to talk about:  Any changes in your health.  Any medicines your care team has prescribed.  Preventive care, family planning, and ways to prevent chronic diseases.  Shots (vaccines)   HPV shots (up to age 26), if you've never had them before.  Hepatitis B shots (up to age 59), if you've never had them before.  COVID-19 shot: Get this shot when it's due.  Flu shot: Get a flu shot every year.  Tetanus shot: Get a tetanus shot every 10 years.  Pneumococcal, hepatitis A, and RSV shots: Ask your care team if you need these based on your risk.  Shingles shot (for age 50 and up)  General health tests  Diabetes screening:  Starting at age 35, Get screened for diabetes at least every 3 years.  If you are younger than age 35, ask your care team if you should be screened for diabetes.  Cholesterol test: At age 39, start having a cholesterol test every 5 years, or more often if advised.  Bone density scan (DEXA): At age 50, ask your care team if you should have this scan for osteoporosis (brittle bones).  Hepatitis C: Get tested at least once in your life.  STIs (sexually transmitted  infections)  Before age 24: Ask your care team if you should be screened for STIs.  After age 24: Get screened for STIs if you're at risk. You are at risk for STIs (including HIV) if:  You are sexually active with more than one person.  You don't use condoms every time.  You or a partner was diagnosed with a sexually transmitted infection.  If you are at risk for HIV, ask about PrEP medicine to prevent HIV.  Get tested for HIV at least once in your life, whether you are at risk for HIV or not.  Cancer screening tests  Cervical cancer screening: If you have a cervix, begin getting regular cervical cancer screening tests starting at age 21.  Breast cancer scan (mammogram): If you've ever had breasts, begin having regular mammograms starting at age 40. This is a scan to check for breast cancer.  Colon cancer screening: It is important to start screening for colon cancer at age 45.  Have a colonoscopy test every 10 years (or more often if you're at risk) Or, ask your provider about stool tests like a FIT test every year or Cologuard test every 3 years.  To learn more about your testing options, visit:   https://www.yeppt/271361.pdf.  For help making a decision, visit:   https://bit.ly/tp77758.  Prostate cancer screening test: If you have a prostate, ask your care team if a prostate cancer screening test (PSA) at age 55 is right for you.  Lung cancer screening: If you are a current or former smoker ages 50 to 80, ask your care team if ongoing lung cancer screenings are right for you.  For informational purposes only. Not to replace the advice of your health care provider. Copyright   2023 [facility]. All rights reserved. Clinically reviewed by the [facility]. [employer] 310047 - REV 01/24.    Learning About Stress  What is stress?     Stress is your body's response to a hard situation. Your body can have a physical, emotional, or mental response. Stress is a fact of life for  most people, and it affects everyone differently. What causes stress for you may not be stressful for someone else.  A lot of things can cause stress. You may feel stress when you go on a job interview, take a test, or run a race. This kind of short-term stress is normal and even useful. It can help you if you need to work hard or react quickly. For example, stress can help you finish an important job on time.  Long-term stress is caused by ongoing stressful situations or events. Examples of long-term stress include long-term health problems, ongoing problems at work, or conflicts in your family. Long-term stress can harm your health.  How does stress affect your health?  When you are stressed, your body responds as though you are in danger. It makes hormones that speed up your heart, make you breathe faster, and give you a burst of energy. This is called the fight-or-flight stress response. If the stress is over quickly, your body goes back to normal and no harm is done.  But if stress happens too often or lasts too long, it can have bad effects. Long-term stress can make you more likely to get sick, and it can make symptoms of some diseases worse. If you tense up when you are stressed, you may develop neck, shoulder, or low back pain. Stress is linked to high blood pressure and heart disease.  Stress also harms your emotional health. It can make you greenberg, tense, or depressed. Your relationships may suffer, and you may not do well at work or school.  What can you do to manage stress?  You can try these things to help manage stress:   Do something active. Exercise or activity can help reduce stress. Walking is a great way to get started. Even everyday activities such as housecleaning or yard work can help.  Try yoga or mikie chi. These techniques combine exercise and meditation. You may need some training at first to learn them.  Do something you enjoy. For example, listen to music or go to a movie. Practice your  "hobby or do volunteer work.  Meditate. This can help you relax, because you are not worrying about what happened before or what may happen in the future.  Do guided imagery. Imagine yourself in any setting that helps you feel calm. You can use online videos, books, or a teacher to guide you.  Do breathing exercises. For example:  From a standing position, bend forward from the waist with your knees slightly bent. Let your arms dangle close to the floor.  Breathe in slowly and deeply as you return to a standing position. Roll up slowly and lift your head last.  Hold your breath for just a few seconds in the standing position.  Breathe out slowly and bend forward from the waist.  Let your feelings out. Talk, laugh, cry, and express anger when you need to. Talking with supportive friends or family, a counselor, or a neris leader about your feelings is a healthy way to relieve stress. Avoid discussing your feelings with people who make you feel worse.  Write. It may help to write about things that are bothering you. This helps you find out how much stress you feel and what is causing it. When you know this, you can find better ways to cope.  What can you do to prevent stress?  You might try some of these things to help prevent stress:  Manage your time. This helps you find time to do the things you want and need to do.  Get enough sleep. Your body recovers from the stresses of the day while you are sleeping.  Get support. Your family, friends, and community can make a difference in how you experience stress.  Limit your news feed. Avoid or limit time on social media or news that may make you feel stressed.  Do something active. Exercise or activity can help reduce stress. Walking is a great way to get started.  Where can you learn more?  Go to https://www.healthwise.net/patiented  Enter N032 in the search box to learn more about \"Learning About Stress.\"  Current as of: February 26, 2023               Content Version: " 13.8    6359-9651 [employer].   Care instructions adapted under license by your healthcare professional. If you have questions about a medical condition or this instruction, always ask your healthcare professional. [employer] disclaims any warranty or liability for your use of this information.

## 2024-02-23 NOTE — PROGRESS NOTES
Preventive Care Visit  [facility]  Rita Cowart PA-C, Family Medicine  Feb 23, 2024       SUBJECTIVE:   Saadia is a 63 year old, presenting for the following:  Physical (AWV)        2/23/2024     1:33 PM   Additional Questions   Roomed by jacqueline DELGADO    Today's PHQ-2 Score:       2/23/2024     1:32 PM   PHQ-2 ( 1999 [employer])   Q1: Little interest or pleasure in doing things 0   Q2: Feeling down, depressed or hopeless 0   PHQ-2 Score 0   Q1: Little interest or pleasure in doing things Not at all   Q2: Feeling down, depressed or hopeless Not at all   PHQ-2 Score 0                 Joint Pain  Onset: year  Description:   Location: left shoulder  Character: Sharp, Dull ache, and Burning  Intensity: moderate, severe  Progression of Symptoms: worse  Accompanying Signs & Symptoms:  Other symptoms: none  History:   Previous similar pain: no     Precipitating factors:   Trauma or overuse: no   Alleviating factors:  Improved by: rest/inactivity    Therapies Tried and outcome: chiropractic   Hyperlipidemia Follow-Up    Are you regularly taking any medication or supplement to lower your cholesterol?   yes  Are you having muscle aches or other side effects that you think could be caused by your cholesterol lowering medication?  No    Patient reports having COVID 2 months ago. She feels like she has significant residual fatigue, decreased hearing and feels foggy.       Social History     Tobacco Use    Smoking status: Never    Smokeless tobacco: Never   Substance Use Topics    Alcohol use: No             2/21/2024    10:02 AM   Alcohol Use   Prescreen: >3 drinks/day or >7 drinks/week? No     Reviewed orders with patient.  Reviewed health maintenance and updated orders accordingly - Yes  Lab work is in process    Breast Cancer Screening:    FHS-7:       7/21/2021     9:46 AM 7/27/2022     8:30 AM 10/31/2023     2:33 PM 2/21/2024    10:12 AM   Breast CA Risk Assessment (FHS-7)   Did any of  "your first-degree relatives have breast or ovarian cancer? No No No No   Did any of your relatives have bilateral breast cancer? No No No No   Did any man in your family have breast cancer? No No No No   Did any woman in your family have breast and ovarian cancer? No No No No   Did any woman in your family have breast cancer before age 50 y? No No No No   Do you have 2 or more relatives with breast and/or ovarian cancer? No No No No   Do you have 2 or more relatives with breast and/or bowel cancer? No No No No       Mammogram Screening - Mammogram every 1-2 years updated in Health Maintenance based on mutual decision making  Pertinent mammograms are reviewed under the imaging tab.    History of abnormal Pap smear: NO - age 30-65 PAP every 5 years with negative HPV co-testing recommended      Latest Ref Rng & Units 8/6/2020    11:47 AM 8/6/2020    11:45 AM   PAP / HPV   PAP (Historical)  NIL     HPV 16 DNA NEG^Negative  Negative    HPV 18 DNA NEG^Negative  Negative    Other HR HPV NEG^Negative  Negative      Reviewed and updated as needed this visit by clinical staff   Tobacco  Allergies  Meds              Reviewed and updated as needed this visit by Provider                  Past Medical History:   Diagnosis Date    Calculus of kidney     x 2    Gastritis 12/17/2013      Review of Systems    Review of Systems  Constitutional, neuro, ENT, endocrine, pulmonary, cardiac, gastrointestinal, genitourinary, musculoskeletal, integument and psychiatric systems are negative, except as otherwise noted.    OBJECTIVE:   /86 (BP Location: Left arm, Patient Position: Sitting, Cuff Size: Adult Regular)   Pulse 87   Temp 98.1  F (36.7  C) (Oral)   Resp 12   Ht 1.6 m (5' 3\")   Wt 80.3 kg (177 lb)   LMP 07/03/2007   SpO2 94%   BMI 31.35 kg/m     Estimated body mass index is 31.35 kg/m  as calculated from the following:    Height as of this encounter: 1.6 m (5' 3\").    Weight as of this encounter: 80.3 kg (177 " lb).  Physical Exam  GENERAL: alert and no distress  EYES: Eyes grossly normal to inspection, PERRL and conjunctivae and sclerae normal  HENT: normal cephalic/atraumatic, both ears: clear effusion, nose and mouth without ulcers or lesions, oropharynx clear, and oral mucous membranes moist  NECK: no adenopathy, no asymmetry, masses, or scars  RESP: lungs clear to auscultation - no rales, rhonchi or wheezes  CV: regular rate and rhythm, normal S1 S2, no S3 or S4, no murmur, click or rub, no peripheral edema  ABDOMEN: soft, nontender, no hepatosplenomegaly, no masses and bowel sounds normal  MS: no gross musculoskeletal defects noted, no edema  ORTHO:   SHOULDER Exam-Left   Inspection: no swelling, no bruising, no discoloration, no obvious deformity, no asymmetry, no glenohumeral joint anterior bulge, no distal clavicle elevation, no muscle atrophy, no scapular winging   Tenderness of: SC joint- no , clavicle(prox-mid)- no , clavicle-(mid-distal)- no , AC joint- YES, acromion- no , anterior capsule- YES, prox bicep tendon- YES, greater tuberosity- no , prox humerus- no , supraspinatous- YES, infraspinatous- no , superior trapezious- YES, rhomboids- no    Range of Motion: Active- limited due to pain.  Range of Motion: Passive- limited due to pain.              SKIN: no suspicious lesions or rashes  NEURO: Normal strength and tone, mentation intact and speech normal  PSYCH: mentation appears normal, affect normal/bright        ASSESSMENT/PLAN:   (Z00.00) Routine general medical examination at a health care facility  (primary encounter diagnosis)  Comment:   Plan: CBC with platelets, Comprehensive metabolic         panel (BMP + Alb, Alk Phos, ALT, AST, Total.         Bili, TP), Lipid panel reflex to direct LDL         Fasting, Hemoglobin A1c            (E78.5) Hyperlipidemia LDL goal <130  Comment: await labs.   Plan: rosuvastatin (CRESTOR) 10 MG tablet            (K29.01) Other acute gastritis with hemorrhage  Comment:  refilled.   Plan: omeprazole (PRILOSEC) 20 MG DR capsule            (L30.9) Dermatitis  Comment: emollients.   Plan: augmented betamethasone dipropionate         (DIPROLENE-AF) 0.05 % external ointment            (M25.512) Left shoulder pain, unspecified chronicity  Comment: recommend ortho referral  Plan: Orthopedic  Referral            (R53.83) Other fatigue  Comment: await labs. Advised may take time after covid  Plan: CRP, inflammation, TSH with free T4 reflex,         Vitamin D Deficiency            (H69.93) Dysfunction of both eustachian tubes  Comment:   Plan: otc nasonex. If no improvement, message and we can refer to ENT     Patient has been advised of split billing requirements and indicates understanding: No      Counseling  Reviewed preventive health counseling, as reflected in patient instructions       Regular exercise       Healthy diet/nutrition       Vision screening        She reports that she has never smoked. She has never used smokeless tobacco.          Signed Electronically by: Rita Cowart PA-C

## 2024-06-11 NOTE — PROGRESS NOTES
[facility]         PATIENT'S NAME: Chio Kingston  PREFERRED NAME: Chio  PRONOUNS:       MRN: [id number]  : 2003  ADDRESS: [id number] [place] [address]  [place] [address]  [facility]  [id number]  DATE OF SERVICE: 24  START TIME: 853 am  END TIME: 921 am  PREFERRED PHONE: [phone]  May we leave a program related message: Yes  EMERGENCY CONTACT: was obtained Windy Kingston (RADHA) [phone] .  SERVICE MODALITY:  Video Visit:      Provider verified identity through the following two step process.  Patient provided:  Patient  and Patient address    Telemedicine Visit: The patient's condition can be safely assessed and treated via synchronous audio and visual telemedicine encounter.      Reason for Telemedicine Visit: Patient convenience (e.g. access to timely appointments / distance to available provider)    Originating Site (Patient Location): Patient's home    Distant Site (Provider Location): Provider Remote Setting- Home Office    Consent:  The patient/guardian has verbally consented to: the potential risks and benefits of telemedicine (video visit) versus in person care; bill my insurance or make self-payment for services provided; and responsibility for payment of non-covered services. [facility] and was advised of the short-term, team based structure of the model including role of C and provider. Patient indicated understanding of the model and agreed to proceed with services as described. Care team has reviewed attendance agreement with patient. Patient advised that two failed appointments within 6 months may lead to termination of current episode of care.     Patient would like the video invitation sent by:  My Chart    Mode of Communication:  Video Conference via [facility]    Distant Location (Provider):  Off-site    As the provider I attest to compliance with applicable laws and regulations related to  "Subjective     Saadia Sorensen is a 60 year old female who presents to clinic today for the following health issues:    HPI       Genitourinary - Female  Onset/Duration: three days ago   Description:   Painful urination (Dysuria): YES- burning            Frequency: YES  Blood in urine (Hematuria): no  Delay in urine (Hesitancy): no  Intensity: 2/10  Progression of Symptoms:  same  Accompanying Signs & Symptoms:  Fever/chills: no  Flank pain: no  Nausea and vomiting: no  Vaginal symptoms: discharge  Abdominal/Pelvic Pain: YES-  History:   History of frequent UTI s: YES  History of kidney stones: YE  Sexually Active: YES  Possibility of pregnancy: No  Symptoms of UTI started on 9/20/2020, voiding every 30 min in small amounts, burning present.    Vaginal discharge: no itching or odor.    Review of Systems   CONSTITUTIONAL: NEGATIVE for fever, chills, change in weight  RESP: NEGATIVE for significant cough or SOB  CV: NEGATIVE for chest pain, palpitations or peripheral edema  : see HPI      Objective    /70 (BP Location: Right arm, Patient Position: Sitting, Cuff Size: Adult Large)   Pulse 80   Temp 98.2  F (36.8  C) (Oral)   Resp 18   Ht 1.575 m (5' 2\")   Wt 75.8 kg (167 lb)   LMP 07/03/2007   SpO2 98%   BMI 30.54 kg/m    Body mass index is 30.54 kg/m .  Physical Exam   GENERAL: healthy, alert and no distress  RESP: lungs clear to auscultation - no rales, rhonchi or wheezes  CV: regular rate and rhythm, normal S1 S2, no S3 or S4, no murmur, click or rub, no peripheral edema and peripheral pulses strong  ABDOMEN: soft, suprapubic tenderness, no hepatosplenomegaly, no masses and bowel sounds normal            Assessment & Plan     Dysuria:  Wet prep negative for yeast, bacterial vaginosis. UA with small leuk and WBC 5-10. Will treat with Macrobid also prescribe pyridium for urgency, frequency.  Increase fluids, urine culture pending.  - Wet prep  - UA reflex to Microscopic and Culture  - Urine Microscopic  - " "telemedicine.    UNIVERSAL ADULT Mental Health DIAGNOSTIC ASSESSMENT    Identifying Information:  Patient is a 20 year old,   individual.  Patient was referred for an assessment by primary care provider.  Patient attended the session alone.    Chief Complaint:   The reason for seeking services at this time is: \"OCD and current medications\".  The problem(s) began 09/01/21. Pt reports that she started therapy in 2021 but feels she's had OCD most of her life    Patient has attempted to resolve these concerns in the past through medication and therapy 2 years ago . [facility] inquired about referral for long term counseling for OCD but pt would like to think about it. [facility] informed pt that she can respond to the message [facility] sent yesterday if she would like a referral.    Most important: med consult      Social/Family History:  Patient reported they grew up in [place]  .  They were raised by biological parents  .  Parents were always together.  Pt has one older B. Patient reported that their childhood was \"average, financially stable, had OCD my whole childhood, parents didn't know how to interpret\".  Patient described their current relationships with family of origin as \"alright, get along with B well. .     The patient describes their cultural background as .  Cultural influences and impact on patient's life structure, values, norms, and healthcare: None.  Contextual influences on patient's health include: none noted.    These factors will be addressed in the Preliminary Treatment plan. Patient identified their preferred language to be English. Patient reported they does not need the assistance of an  or other support involved in therapy.     Patient reported had no significant delays in developmental tasks.   Patient's highest education level was some college  .  Patient identified the following learning problems: none reported.  Modifications will not be used to assist communication in " nitroFURantoin macrocrystal-monohydrate (MACROBID) 100 MG capsule  Dispense: 14 capsule; Refill: 0  - phenazopyridine (PYRIDIUM) 200 MG tablet  Dispense: 15 tablet; Refill: 0  - Urine Culture Aerobic Bacterial    Vaginal discharge:wet prep negative, patient informed.        Return if symptoms worsen or fail to improve.    Susan Haase, APRN [facility]     therapy.  Patient reports they are  able to understand written materials.    Patient reported the following relationship history never .  Patient's current relationship status is single.   Patient identified their sexual orientation as asexual.  Patient reported having   no child(jose roberto). Patient identified siblings; friends as part of their support system.  Patient identified the quality of these relationships as fair,  .      Patient's current living/housing situation involves staying in own home/apartment.  The immediate members of family and household include Windy Kingston, 59,Mother  and they report that housing is stable.    Patient is currently employed fulltime at [employer] at [employer]. 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 Patient does not identify finances as a current stressor.     Patient reported that they have not been involved with the legal system.    . Patient does not report being under probation/ parole/ jurisdiction. They are not under any current court jurisdiction. .    Patient's Strengths and Limitations:  Patient identified the following strengths or resources that will help them succeed in treatment: friends / good social support, family support, insight, intelligence, and motivation. Things that may interfere with the patient's success in treatment include: none identified.     Assessments:  The following assessments were completed by patient for this visit:  PHQ2:       1/10/2024    10:34 AM 6/5/2023     1:45 PM 11/21/2022     3:21 PM 9/19/2022     3:14 PM 3/21/2022     7:03 AM 6/23/2021     8:16 AM 4/17/2019     8:06 AM   PHQ-2 ( 1999 [employer])   Q1: Little interest or pleasure in doing things 1 2 1 1 0 0 0   Q2: Feeling down, depressed or hopeless 1 1 1 1 0 0 0   PHQ-2 Score 2 3 2 2  0 0   PHQ-2 Total Score (12-17 Years)- Positive if 3 or more points; Administer PHQ-A if positive     0 0 0    Q1: Little interest or pleasure in doing things  More than half the days Several days Several days    Several days      Q2: Feeling down, depressed or hopeless  Several days Several days Several days    Several days      PHQ-2 Score  3 2 2    2        PHQ9:       11/21/2022     3:19 PM 6/5/2023     1:45 PM 7/3/2023     6:56 PM 8/8/2023    11:52 AM 1/10/2024     4:43 PM 3/13/2024     3:12 PM 6/11/2024     8:29 AM   PHQ-9 SCORE   PHQ-9 Total Score MyChart 7 (Mild depression) 7 (Mild depression) 9 (Mild depression)    7 (Mild depression)   PHQ-9 Total Score 7 7 9 5 4 5 7    7     GAD2:       6/11/2024     8:30 AM   GERONIMO-2   Feeling nervous, anxious, or on edge 2   Not being able to stop or control worrying 1   GERONIMO-2 Total Score 3    3     GAD7:       11/21/2022     3:20 PM 6/5/2023     1:52 PM 7/3/2023     6:57 PM 8/8/2023    11:52 AM 1/10/2024     4:43 PM 3/13/2024     3:12 PM 6/11/2024     8:30 AM   GERONIMO-7 SCORE   Total Score 5 (mild anxiety) 7 (mild anxiety) 7 (mild anxiety)    7 (mild anxiety)   Total Score 5 7 7 7 5 6 7    7     CAGE-AID:       6/11/2024     8:31 AM   CAGE-AID Total Score   Total Score 0    0   Total Score MyChart 0 (A total score of 2 or greater is considered clinically significant)     PROMIS 10-Global Health (all questions and answers displayed):       6/11/2024     8:31 AM   PROMIS 10   In general, would you say your health is: Good   In general, would you say your quality of life is: Very good   In general, how would you rate your physical health? Very good   In general, how would you rate your mental health, including your mood and your ability to think? Good   In general, how would you rate your satisfaction with your social activities and relationships? Good   In general, please rate how well you carry out your usual social activities and roles Very good   To what extent are you able to carry out your everyday physical activities such as walking, climbing stairs, carrying groceries, or  moving a chair? Completely   In the past 7 days, how often have you been bothered by emotional problems such as feeling anxious, depressed, or irritable? Sometimes   In the past 7 days, how would you rate your fatigue on average? Moderate   In the past 7 days, how would you rate your pain on average, where 0 means no pain, and 10 means worst imaginable pain? 2   In general, would you say your health is: 3   In general, would you say your quality of life is: 4   In general, how would you rate your physical health? 4   In general, how would you rate your mental health, including your mood and your ability to think? 3   In general, how would you rate your satisfaction with your social activities and relationships? 3   In general, please rate how well you carry out your usual social activities and roles. (This includes activities at home, at work and in your community, and responsibilities as a parent, child, spouse, employee, friend, etc.) 4   To what extent are you able to carry out your everyday physical activities such as walking, climbing stairs, carrying groceries, or moving a chair? 5   In the past 7 days, how often have you been bothered by emotional problems such as feeling anxious, depressed, or irritable? 3   In the past 7 days, how would you rate your fatigue on average? 3   In the past 7 days, how would you rate your pain on average, where 0 means no pain, and 10 means worst imaginable pain? 2   Global Mental Health Score 13    13   Global Physical Health Score 16    16   PROMIS TOTAL - SUBSCORES 29    29     PROMIS 10-Global Health (only subscores and total score):       6/11/2024     8:31 AM   PROMIS-10 Scores Only   Global Mental Health Score 13    13   Global Physical Health Score 16    16   PROMIS TOTAL - SUBSCORES 29    29     [place] Suicide Severity Rating Scale (Lifetime/Recent)      6/11/2024    11:57 AM   [place] Suicide Severity Rating (Lifetime/Recent)   Q1 Wish to be Dead (Lifetime) N   Q2  Non-Specific Active Suicidal Thoughts (Lifetime) N   Actual Attempt (Lifetime) N   Has subject engaged in non-suicidal self-injurious behavior? (Lifetime) N   Interrupted Attempts (Lifetime) N   Aborted or Self-Interrupted Attempt (Lifetime) N   Preparatory Acts or Behavior (Lifetime) N   Calculated C-SSRS Risk Score (Lifetime/Recent) No Risk Indicated       Personal and Family Medical History:  Patient does report a family history of mental health concerns.  Patient reports family history includes Cancer in her maternal grandmother, maternal uncle, and mother; Diabetes in her paternal uncle..     Patient does report Mental Health Diagnosis and/or Treatment.  Patient reported the following previous diagnoses which include(s): obsessive compulsive disorder .  Patient reported symptoms began in childhood.  Patient has received mental health services in the past:  therapy  .  Psychiatric Hospitalizations: none when   ,  ,  ,  ,  ,  ,  ,  ,  ,  ,  .    Patient denies a history of civil commitment.      Currently, patient none  is receiving other mental health services.  These include none.       Patient has had a physical exam to rule out medical causes for current symptoms.  Date of last physical exam was within the past year. Client was encouraged to follow up with PCP if symptoms were to develop. The patient has a [place] Primary Care Provider, who is named Audelia Squires..  Patient reports no current medical concerns.  Patient denies any issues with pain..   There are not significant appetite / nutritional concerns / weight changes.   Patient does report a history of head injury / trauma / cognitive impairment. Minor concussion age 11.     Patient reports current meds as:   Current Outpatient Medications   Medication Sig Dispense Refill    Cholecalciferol (VITAMIN D PO) Take  by mouth daily.      naproxen (NAPROSYN) 500 MG tablet TAKE 1 TABLET(500 MG) BY MOUTH TWICE DAILY WITH MEALS 60 tablet 0    Omega-3 Fatty  Acids (FISH OIL PO) Take  by mouth.      Pediatric Multivitamins-Fl (MULTI VIT/FL PO) Take  by mouth.      sertraline (ZOLOFT) 100 MG tablet Take 1 tablet (100 mg) by mouth daily 90 tablet 1    SUMAtriptan (IMITREX) 50 MG tablet Take 1 tablet (50 mg) by mouth at onset of headache for migraine May repeat in 2 hours. Max 4 tablets/24 hours. 30 tablet 0     No current facility-administered medications for this visit.       Medication Adherence:  Patient reports taking.  .    Patient Allergies:    Allergies   Allergen Reactions    Penicillins Rash       Medical History:    Past Medical History:   Diagnosis Date    Amblyopia of right eye 01/24/2013    OCD (obsessive compulsive disorder)          Current Mental Status Exam:   Appearance:  Appropriate    Eye Contact:  Good   Psychomotor:  Normal       Gait / station:  no problem  Attitude / Demeanor: Cooperative   Speech      Rate / Production: Normal/ Responsive      Volume:  Normal  volume      Language:  intact  Mood:   Anxious   Affect:   Blunted    Thought Content: Clear   Thought Process: Coherent  Logical       Associations: No loosening of associations  Insight:   Good   Judgment:  Intact   Orientation:  All  Attention/concentration: Good    Substance Use:   Patient did not report a family history of substance use concerns; see medical history section for details.  Patient has not received chemical dependency treatment in the past.  Patient has not ever been to detox.      Patient is not currently receiving any chemical dependency treatment.           Substance History of use Age of first use Date of last use     Pattern and duration of use (include amounts and frequency)   Alcohol never used       REPORTS SUBSTANCE USE: N/A   Cannabis   never used     REPORTS SUBSTANCE USE: N/A     Amphetamines   never used     REPORTS SUBSTANCE USE: N/A   Cocaine/crack    never used       REPORTS SUBSTANCE USE: N/A   Hallucinogens never used         REPORTS SUBSTANCE USE: N/A    Inhalants never used         REPORTS SUBSTANCE USE: N/A   Heroin never used         REPORTS SUBSTANCE USE: N/A   Other Opiates never used     REPORTS SUBSTANCE USE: N/A   Benzodiazepine   never used     REPORTS SUBSTANCE USE: N/A   Barbiturates never used     REPORTS SUBSTANCE USE: N/A   Over the counter meds never used     REPORTS SUBSTANCE USE: N/A   Caffeine currently use 13   unremarkable   Nicotine  never used     REPORTS SUBSTANCE USE: N/A   Other substances not listed above:  Identify:  never used     REPORTS SUBSTANCE USE: N/A     Patient reported the following problems as a result of their substance use: no problems, not applicable.    Substance Use: No symptoms    Based on the negative CAGE score and clinical interview there  are not indications of drug or alcohol abuse.    Significant Losses / Trauma / Abuse / Neglect Issues:   Patient did not  serve in the .  There are indications or report of significant loss, trauma, abuse or neglect issues related to: are no indications and client denies any losses, trauma, abuse, or neglect concerns.  Concerns for possible neglect are not present.     Safety Assessment:   Patient denies current homicidal ideation and behaviors.  Patient denies current self-injurious ideation and behaviors.    Patient denied risk behaviors associated with substance use.   Patient denies any high risk behaviors associated with mental health symptoms.  Patient reports the following current concerns for their personal safety: None.  Patient reports there are not firearms in the house.       .    History of Safety Concerns:  Patient denied a history of homicidal ideation.     Patient denied a history of personal safety concerns.    Patient denied a history of assaultive behaviors.    Patient denied a history of sexual assault behaviors.     Patient denied a history of risk behaviors associated with substance use.  Patient denies any history of high risk behaviors associated with  "mental health symptoms.  Patient reports the following protective factors: forward or future oriented thinking; dedication to family or friends; safe and stable environment; regular sleep; effectively controls impulses; regular physical activity; abstinence from substances; adherence with prescribed medication; effective problem solving skills; commitment to well being; positive social skills; healthy fear of risky behaviors or pain; sense of personal control or determination    Risk Plan:  See Recommendations for Safety and Risk Management Plan    Review of Symptoms per patient report:   Depression: No symptoms  Mel:  No Symptoms  Psychosis: No Symptoms  Anxiety: Excessive worry, Nervousness, and Ruminations  Panic:  No symptoms  Post Traumatic Stress Disorder:  No Symptoms   Eating Disorder: No Symptoms  ADD / ADHD:  No symptoms  Conduct Disorder: No symptoms  Autism Spectrum Disorder: No symptoms  Obsessive Compulsive Disorder: Checking, Obsessions, and ruminations (focus of those varies, rituals if not done correctly have to be re-done, \"slows me down\",safety concerns for self and others (no times when she felt unsafe for self/others)    Patient reports the following compulsive behaviors and treatment history:  none .      Diagnostic Criteria:   Obsessive Compulsive Disorder Criteria: Obsessive Compulsive Disorder  !!!FYI: MUST MEET FULL CRITERIA FOR EITHER OBSESSIONS OR COMPULSIONS!!!  Client experiences Obsessions as defined by (1), (2), (3), (4):    (1) recurrent and persistent thoughts, impulses, or images that are experienced, at some time during the disturbance, as intrusive and inappropriate and that cause marked anxiety or distress     (2) the thoughts, impulses, or images are not simply excessive worries about real-life problems     (3) the client attempts to ignore or suppress such thoughts, impulses, or images, or to neutralize them with some other thought or action     (4) the client recognizes " that the obsessional thoughts, impulses, or images are a product of his or her own mind (not imposed from without as in thought insertion)   Client experiences Compulsions as defined by (1) and (2):    (1) repetitive behaviors (e.g., hand washing, ordering, checking) or mental acts (e.g., praying, counting, repeating words silently) that the person feels driven to perform in response to an obsession, or according to rules that must be applied rigidly     (2) the behaviors or mental acts are aimed at preventing or reducing distress or preventing some dreaded event or situation; however, these behaviors or mental acts either are not connected in a realistic way with what they are designed to neutralize or prevent or are clearly excessive   At some point during the course of the disorder, the person has recognized that the obsessions or compulsions are excessive or unreasonable  The content of the obsessions or compulsions are not restricted to another Axis I Disorder (e.g., preoccupation with food in the presence of an Eating Disorders; hair pulling in the presence of Trichotillomania; concern with appearance in the presence of Body Dysmorphic Disorder; preoccupation with drugs in the presence of a Substance Use Disorder; preoccupation with having a serious illness in the presence of Hypochondriasis; preoccupation with sexual urges or fantasies in the presence of a Paraphilia; or guilty ruminations in the presence of Major Depressive Disorder).   The disturbance is not due to the direct physiological effects of a substance (e.g., a drug of abuse, a medication) or a general medical condition    Functional Status:  Patient reports the following functional impairments:  self-care, social interactions, and work / vocational responsibilities.     Nonprogrammatic care:  Patient is requesting basic services to address current mental health concerns.    Clinical Summary:  1. Psychosocial, Cultural and Contextual Factors: none  noted  .  2. Principal DSM5 Diagnoses  (Sustained by DSM5 Criteria Listed Above):   300.3 (F42) Obsessive Compulsive Disorder.  3. Other Diagnoses that is relevant to services:   300.3 (F42) Obsessive Compulsive Disorder.  4. Provisional Diagnosis:  300.3 (F42) Obsessive Compulsive Disorder as evidenced by ROS, GERONIMO, PHQ, chart review and the interview.    5. Prognosis: Relieve Acute Symptoms.  6. Likely consequences of symptoms if not treated: Worsening symptoms and diminishing ability to function.  .  7. Client strengths include:  goal-focused, good listener, has a previous history of therapy, insightful, intelligent, motivated, and support of family, friends and providers .     Recommendations:     1. Plan for Safety and Risk Management:   Safety and Risk: Recommended that patient call 911 or go to the local ED should there be a change in any of these risk factors..          Report to child / adult protection services was NA.     2. Patient's identified mental health concerns with a cultural influence will be addressed by at the request of the pt .     3. Initial Treatment will focus on:    Functional Impairment at: home.     4. Resources/Service Plan:    services are not indicated.   Modifications to assist communication are not indicated.   Additional disability accommodations are not indicated.      5. Collaboration:   Collaboration / coordination of treatment will be initiated with the following  support professionals: Collaborated with [facility] team .      6.  Referrals:   The following referral(s) will be initiated:  TBD .       A Release of Information has been obtained for the following:  none .     Clinical Substantiation/medical necessity for the above recommendations:  see assessment.    7. DAV:    DAV:   no problem use noted . Provider gave patient printed information about the  effects of chemical use on their health and well being. Recommendations:  none .     8. Records:   These were reviewed  at time of assessment.   Information in this assessment was obtained from the medical record and  provided by patient who is a good historian.    Patient will have open access to their mental health medical record.    9.   Interactive Complexity: No    10. Safety Plan:       Provider Name/ Credentials:  Geovanna Blackwell [facility]  She/her  [facility]  Office hours: Tuesday-Friday 7:00 AM-5:00 PM   June 11, 2024     Crisis Resources    Refer to the resources below as needed.    Steps to care for yourself    If you are currently in counseling, call your counselor for an appointment  Call the local crisis resources below if needed.  Contact friends or family for support.  Get more exercise.  Do activities you enjoy.  Eat a well-balanced diet and drink plenty of fluids.  Rest as needed.  Limit alcohol and recreational drugs. These can worsen depression.  Properly store or remove fire arms.     When to contact your primary care provider     You have thoughts of harming or killing yourself but have not made a plan to carry it out.  Your depression gets in the way of daily activities.  You are often unable to sleep.  You need help cutting back on alcohol or recreational drugs.    When to call 911 or go to the Emergency Room     Get emergency help right away if you have any of the following:  You are planning to harm or kill yourself and you have a way to carry out the plan.   You have injured yourself or others. Or, you think you will.  You feel confused or are having trouble thinking or remembering.  You are having delusions (false beliefs).  You are hearing voices or seeing things that aren t there.  You are feeling psychotic (paranoid, fearful, restless, agitated, nervous, racing thoughts or speech)    Crisis Resources   The [facility] is an adults only unit located at [facility] in [place] is a short term (generally less than 23 hour stay) designed for crisis intervention and  stabilization. Pts have the opportunity to meet quickly with a behavioral health team for evaluation in a calm and peaceful therapuetic environment. To be evaluated for admission pts are triaged throught the [facility] ED.     The [facility] ([facility]) is located at the [facility].The BEC is open to ages and provides a comprehensive behavioral health evaluation to those in crisis. Patients typically stay 24-36 hours.     The following hotlines are for both adults and children. The and are open 24 hours a day, 7 days a week unless noted otherwise.      Crisis Lines        Gambling Hotline  5.831.730.2937 [hope]        línea de crisis española  153.482.3939        [facility] [employer] Helpline  722.568.6525        National Hope Line  3.071.872.3418 [hope]        National Suicide Prevention Lifeline: text 988        The [employer] Project (LGBTQ Youth Crisis Line)  7.618.730.3944  text START to 069-662        [facility] Crisis Line  6.270.211.1655 (Press 1)  or text 379889        [facility] Crisis  809.840.6084      [facility] Mobile Crisis  312.482.9940      [facility] Crisis  172.990.2357      [facility] Mobile Crisis  200.332.5424 (adults)  [phone] (children)      [facility] Mobile Crisis  [phone] (adults)  [phone] (children)      [facility] Mobile Crisis  [phone]      [facility] Mobile Crisis  650.163.4932    Community Resources      Fast Tracker  Linking people to mental health and substance use disorder resources  fasttrackermn.org        National [place] on Mental Illness (JORGE)  031.639.0598 or 1.888.JORGE.HELPS  https://namimn.org/        [facility]  [facility] residents only   [address] [facility]  [phone]        [facility]  Free mental health counseling  https://walkin.org/  205.870.0565 X2    Mental Health Apps       Calm Harm  https://calmharm.co.uk/      My3  https://my3app.org/      [place] Safety Plan  https://www.mysafetyplan.org/

## 2024-07-12 ENCOUNTER — OFFICE VISIT (OUTPATIENT)
Dept: URGENT CARE | Facility: URGENT CARE | Age: 64
End: 2024-07-12
Payer: COMMERCIAL

## 2024-07-12 VITALS
OXYGEN SATURATION: 98 % | HEART RATE: 76 BPM | TEMPERATURE: 98.1 F | RESPIRATION RATE: 14 BRPM | SYSTOLIC BLOOD PRESSURE: 143 MMHG | DIASTOLIC BLOOD PRESSURE: 83 MMHG

## 2024-07-12 DIAGNOSIS — H69.92 DYSFUNCTION OF LEFT EUSTACHIAN TUBE: Primary | ICD-10-CM

## 2024-07-12 PROCEDURE — 99213 OFFICE O/P EST LOW 20 MIN: CPT | Performed by: PHYSICIAN ASSISTANT

## 2024-07-12 RX ORDER — MOMETASONE FUROATE MONOHYDRATE 50 UG/1
2 SPRAY, METERED NASAL DAILY
Qty: 17 G | Refills: 0 | Status: SHIPPED | OUTPATIENT
Start: 2024-07-12

## 2024-07-12 RX ORDER — PREDNISONE 20 MG/1
40 TABLET ORAL DAILY
Qty: 10 TABLET | Refills: 0 | Status: SHIPPED | OUTPATIENT
Start: 2024-07-12 | End: 2024-07-17

## 2024-07-12 ASSESSMENT — ENCOUNTER SYMPTOMS
RHINORRHEA: 1
COUGH: 1
FEVER: 0

## 2024-07-12 NOTE — PROGRESS NOTES
Assessment & Plan:        ICD-10-CM    1. Dysfunction of left eustachian tube  H69.92 mometasone (NASONEX) 50 MCG/ACT nasal spray     predniSONE (DELTASONE) 20 MG tablet            Plan/Clinical Decision Making:    Patient with acute left ear pain with recent URI with nasal congestion and rhinorrhea.   No sign of ear infection. Discussed continuing Mucinex. Can use Tylenol for pain.   Will start nasaonex. Doesn't sound like she likes using nasal sprays at all. Will try short course of prednisone to help with symptoms. Discussed side effects.       Return if symptoms worsen or fail to improve, for in 2-3 days.     At the end of the encounter, I discussed results, diagnosis, medications. Discussed red flags for immediate return to clinic/ER, as well as indications for follow up if no improvement. Patient understood and agreed to plan. Patient was stable for discharge.        Missy Bateman PA-C on 7/12/2024 at 5:29 PM          Subjective:     HPI:    Saadia is a 63 year old female who presents to clinic today for the following health issues:  Chief Complaint   Patient presents with    Ear Problem     Left ear pain, very painful, prone to ear infections.     HPI    Has had head cold, nasal congestion and left ear pain.   Hx of recurrent OM in past.   Using Mucinex. Used Flonase in past and not helpful.       Review of Systems   Constitutional:  Negative for fever.   HENT:  Positive for congestion, ear pain, postnasal drip and rhinorrhea. Negative for ear discharge.    Respiratory:  Positive for cough (getting better, mild now).          Patient Active Problem List   Diagnosis    Hypercholesteremia    Hyperlipidemia LDL goal <130    Gastritis    History of nephrolithiasis    Family history of colon cancer    Osteopenia of lumbar spine    Upper GI bleed    Anemia due to blood loss, acute        Past Medical History:   Diagnosis Date    Calculus of kidney     x 2    Gastritis 12/17/2013       Social History     Tobacco  Use    Smoking status: Never    Smokeless tobacco: Never   Substance Use Topics    Alcohol use: No             Objective:     Vitals:    07/12/24 1725 07/12/24 1726   BP: (!) 147/82 (!) 143/83   Pulse: 76    Resp: 14    Temp: 98.1  F (36.7  C)    SpO2: 98%          Physical Exam   EXAM:   Pleasant, alert, appropriate appearance. NAD.  Head Exam: Normocephalic, atraumatic.  Eye Exam:   non icteric/injection.    Ear Exam: TMs grey without bulging. Normal canals.  Normal pinna. Scarring bilateral Tms. Slight serous appearance left TM.  Nose Exam: Normal external nose.    OroPharynx Exam:  Moist mucous membranes. No erythema, pharynx without exudate or hypertrophy.  Neck/Thyroid Exam:  No LAD.  Chest/Respiratory Exam: CTAB.  Cardiovascular Exam: RRR. No murmur or rubs.      Results:  No results found for any visits on 07/12/24.

## 2024-11-14 ENCOUNTER — HOSPITAL ENCOUNTER (OUTPATIENT)
Dept: MAMMOGRAPHY | Facility: CLINIC | Age: 64
Discharge: HOME | End: 2024-11-14
Attending: OBSTETRICS & GYNECOLOGY
Payer: COMMERCIAL

## 2024-11-14 DIAGNOSIS — Z12.31 SCREENING MAMMOGRAM FOR BREAST CANCER: ICD-10-CM

## 2024-11-14 PROCEDURE — 77063 BREAST TOMOSYNTHESIS BI: CPT

## 2025-04-04 ENCOUNTER — ANCILLARY ORDERS (OUTPATIENT)
Dept: MAMMOGRAPHY | Facility: CLINIC | Age: 65
End: 2025-04-04
Payer: COMMERCIAL

## 2025-04-04 DIAGNOSIS — Z12.31 VISIT FOR SCREENING MAMMOGRAM: Primary | ICD-10-CM

## 2025-04-12 ENCOUNTER — HEALTH MAINTENANCE LETTER (OUTPATIENT)
Age: 65
End: 2025-04-12

## (undated) DEVICE — KIT ENDO TURNOVER/PROCEDURE W/CLEAN A SCOPE LINERS 103888

## (undated) DEVICE — ENDO SNARE EXACTO COLD 9MM LOOP 2.4MMX230CM 00711115

## (undated) DEVICE — ENDO BITE BLOCK ADULT OLYMPUS LATEX FREE MAJ-1632

## (undated) DEVICE — ENDO TRAP POLYP QUICK CATCH 710201

## (undated) DEVICE — ENDO FORCEP ENDOJAW BIOPSY 2.8MMX160CM FB-220K

## (undated) RX ORDER — FENTANYL CITRATE 0.05 MG/ML
INJECTION, SOLUTION INTRAMUSCULAR; INTRAVENOUS
Status: DISPENSED
Start: 2022-06-28

## (undated) RX ORDER — FENTANYL CITRATE 50 UG/ML
INJECTION, SOLUTION INTRAMUSCULAR; INTRAVENOUS
Status: DISPENSED
Start: 2019-09-04